# Patient Record
Sex: MALE | Race: WHITE | NOT HISPANIC OR LATINO | Employment: FULL TIME | URBAN - METROPOLITAN AREA
[De-identification: names, ages, dates, MRNs, and addresses within clinical notes are randomized per-mention and may not be internally consistent; named-entity substitution may affect disease eponyms.]

---

## 2024-05-23 ENCOUNTER — APPOINTMENT (OUTPATIENT)
Dept: RADIOLOGY | Facility: HOSPITAL | Age: 59
End: 2024-05-23
Payer: COMMERCIAL

## 2024-05-23 ENCOUNTER — APPOINTMENT (OUTPATIENT)
Dept: CARDIOLOGY | Facility: HOSPITAL | Age: 59
End: 2024-05-23
Payer: COMMERCIAL

## 2024-05-23 ENCOUNTER — HOSPITAL ENCOUNTER (EMERGENCY)
Facility: HOSPITAL | Age: 59
Discharge: HOME | End: 2024-05-23
Attending: STUDENT IN AN ORGANIZED HEALTH CARE EDUCATION/TRAINING PROGRAM
Payer: COMMERCIAL

## 2024-05-23 VITALS
OXYGEN SATURATION: 100 % | WEIGHT: 234 LBS | BODY MASS INDEX: 31.01 KG/M2 | DIASTOLIC BLOOD PRESSURE: 75 MMHG | RESPIRATION RATE: 18 BRPM | HEIGHT: 73 IN | TEMPERATURE: 97.7 F | SYSTOLIC BLOOD PRESSURE: 111 MMHG
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
## 2024-05-23 DIAGNOSIS — S60.222A CONTUSION OF LEFT HAND, INITIAL ENCOUNTER: ICD-10-CM

## 2024-05-23 DIAGNOSIS — M79.642 PAIN OF LEFT HAND: ICD-10-CM

## 2024-05-23 LAB
ALBUMIN SERPL BCP-MCNC: 4.2 G/DL (ref 3.4–5)
ALP SERPL-CCNC: 78 U/L (ref 33–136)
ALT SERPL W P-5'-P-CCNC: 20 U/L (ref 10–52)
ANION GAP SERPL CALC-SCNC: 11 MMOL/L (ref 10–20)
AST SERPL W P-5'-P-CCNC: 21 U/L (ref 9–39)
BASOPHILS # BLD AUTO: 0.04 X10*3/UL (ref 0–0.1)
BASOPHILS NFR BLD AUTO: 0.6 %
BILIRUB SERPL-MCNC: 1.5 MG/DL (ref 0–1.2)
BUN SERPL-MCNC: 30 MG/DL (ref 6–23)
CALCIUM SERPL-MCNC: 8.6 MG/DL (ref 8.6–10.3)
CARDIAC TROPONIN I PNL SERPL HS: 7 NG/L (ref 0–20)
CHLORIDE SERPL-SCNC: 111 MMOL/L (ref 98–107)
CO2 SERPL-SCNC: 21 MMOL/L (ref 21–32)
CREAT SERPL-MCNC: 1.19 MG/DL (ref 0.5–1.3)
EGFRCR SERPLBLD CKD-EPI 2021: 70 ML/MIN/1.73M*2
EOSINOPHIL # BLD AUTO: 0.12 X10*3/UL (ref 0–0.7)
EOSINOPHIL NFR BLD AUTO: 1.7 %
ERYTHROCYTE [DISTWIDTH] IN BLOOD BY AUTOMATED COUNT: 11.8 % (ref 11.5–14.5)
GLUCOSE SERPL-MCNC: 99 MG/DL (ref 74–99)
HCT VFR BLD AUTO: 43.3 % (ref 41–52)
HGB BLD-MCNC: 15 G/DL (ref 13.5–17.5)
IMM GRANULOCYTES # BLD AUTO: 0.03 X10*3/UL (ref 0–0.7)
IMM GRANULOCYTES NFR BLD AUTO: 0.4 % (ref 0–0.9)
LYMPHOCYTES # BLD AUTO: 1.76 X10*3/UL (ref 1.2–4.8)
LYMPHOCYTES NFR BLD AUTO: 24.7 %
MCH RBC QN AUTO: 31.3 PG (ref 26–34)
MCHC RBC AUTO-ENTMCNC: 34.6 G/DL (ref 32–36)
MCV RBC AUTO: 90 FL (ref 80–100)
MONOCYTES # BLD AUTO: 0.56 X10*3/UL (ref 0.1–1)
MONOCYTES NFR BLD AUTO: 7.9 %
NEUTROPHILS # BLD AUTO: 4.61 X10*3/UL (ref 1.2–7.7)
NEUTROPHILS NFR BLD AUTO: 64.7 %
NRBC BLD-RTO: 0 /100 WBCS (ref 0–0)
PLATELET # BLD AUTO: 179 X10*3/UL (ref 150–450)
POTASSIUM SERPL-SCNC: 3.9 MMOL/L (ref 3.5–5.3)
PROT SERPL-MCNC: 6.5 G/DL (ref 6.4–8.2)
RBC # BLD AUTO: 4.8 X10*6/UL (ref 4.5–5.9)
SODIUM SERPL-SCNC: 139 MMOL/L (ref 136–145)
WBC # BLD AUTO: 7.1 X10*3/UL (ref 4.4–11.3)

## 2024-05-23 PROCEDURE — 2550000001 HC RX 255 CONTRASTS: Performed by: STUDENT IN AN ORGANIZED HEALTH CARE EDUCATION/TRAINING PROGRAM

## 2024-05-23 PROCEDURE — 71045 X-RAY EXAM CHEST 1 VIEW: CPT | Performed by: RADIOLOGY

## 2024-05-23 PROCEDURE — 72125 CT NECK SPINE W/O DYE: CPT

## 2024-05-23 PROCEDURE — 85025 COMPLETE CBC W/AUTO DIFF WBC: CPT | Performed by: STUDENT IN AN ORGANIZED HEALTH CARE EDUCATION/TRAINING PROGRAM

## 2024-05-23 PROCEDURE — 36415 COLL VENOUS BLD VENIPUNCTURE: CPT | Performed by: STUDENT IN AN ORGANIZED HEALTH CARE EDUCATION/TRAINING PROGRAM

## 2024-05-23 PROCEDURE — 80053 COMPREHEN METABOLIC PANEL: CPT | Performed by: STUDENT IN AN ORGANIZED HEALTH CARE EDUCATION/TRAINING PROGRAM

## 2024-05-23 PROCEDURE — 70450 CT HEAD/BRAIN W/O DYE: CPT | Performed by: RADIOLOGY

## 2024-05-23 PROCEDURE — 73110 X-RAY EXAM OF WRIST: CPT | Mod: LEFT SIDE | Performed by: RADIOLOGY

## 2024-05-23 PROCEDURE — 73130 X-RAY EXAM OF HAND: CPT | Mod: LT

## 2024-05-23 PROCEDURE — 72128 CT CHEST SPINE W/O DYE: CPT | Performed by: RADIOLOGY

## 2024-05-23 PROCEDURE — 70450 CT HEAD/BRAIN W/O DYE: CPT

## 2024-05-23 PROCEDURE — 84484 ASSAY OF TROPONIN QUANT: CPT | Performed by: STUDENT IN AN ORGANIZED HEALTH CARE EDUCATION/TRAINING PROGRAM

## 2024-05-23 PROCEDURE — 72131 CT LUMBAR SPINE W/O DYE: CPT

## 2024-05-23 PROCEDURE — 73110 X-RAY EXAM OF WRIST: CPT | Mod: LT

## 2024-05-23 PROCEDURE — 71260 CT THORAX DX C+: CPT | Performed by: RADIOLOGY

## 2024-05-23 PROCEDURE — 71045 X-RAY EXAM CHEST 1 VIEW: CPT

## 2024-05-23 PROCEDURE — 72131 CT LUMBAR SPINE W/O DYE: CPT | Performed by: RADIOLOGY

## 2024-05-23 PROCEDURE — 72125 CT NECK SPINE W/O DYE: CPT | Performed by: RADIOLOGY

## 2024-05-23 PROCEDURE — 99285 EMERGENCY DEPT VISIT HI MDM: CPT | Mod: 25

## 2024-05-23 PROCEDURE — 74177 CT ABD & PELVIS W/CONTRAST: CPT | Performed by: RADIOLOGY

## 2024-05-23 PROCEDURE — 74177 CT ABD & PELVIS W/CONTRAST: CPT

## 2024-05-23 PROCEDURE — 73130 X-RAY EXAM OF HAND: CPT | Mod: LEFT SIDE | Performed by: RADIOLOGY

## 2024-05-23 PROCEDURE — 72128 CT CHEST SPINE W/O DYE: CPT

## 2024-05-23 PROCEDURE — 93005 ELECTROCARDIOGRAM TRACING: CPT

## 2024-05-23 RX ORDER — NAPROXEN 500 MG/1
500 TABLET ORAL
Qty: 30 TABLET | Refills: 0 | Status: SHIPPED | OUTPATIENT
Start: 2024-05-23 | End: 2024-06-07

## 2024-05-23 RX ORDER — ACETAMINOPHEN 325 MG/1
650 TABLET ORAL EVERY 6 HOURS PRN
Qty: 30 TABLET | Refills: 0 | Status: SHIPPED | OUTPATIENT
Start: 2024-05-23

## 2024-05-23 RX ORDER — ACETAMINOPHEN 325 MG/1
975 TABLET ORAL ONCE
Status: COMPLETED | OUTPATIENT
Start: 2024-05-23 | End: 2024-05-23

## 2024-05-23 RX ADMIN — ACETAMINOPHEN 975 MG: 325 TABLET ORAL at 16:38

## 2024-05-23 RX ADMIN — IOHEXOL 90 ML: 350 INJECTION, SOLUTION INTRAVENOUS at 19:06

## 2024-05-23 ASSESSMENT — PAIN - FUNCTIONAL ASSESSMENT: PAIN_FUNCTIONAL_ASSESSMENT: 0-10

## 2024-05-23 ASSESSMENT — LIFESTYLE VARIABLES
HAVE YOU EVER FELT YOU SHOULD CUT DOWN ON YOUR DRINKING: NO
EVER FELT BAD OR GUILTY ABOUT YOUR DRINKING: NO
HAVE PEOPLE ANNOYED YOU BY CRITICIZING YOUR DRINKING: NO
EVER HAD A DRINK FIRST THING IN THE MORNING TO STEADY YOUR NERVES TO GET RID OF A HANGOVER: NO
TOTAL SCORE: 0

## 2024-05-23 ASSESSMENT — PAIN DESCRIPTION - LOCATION: LOCATION: HAND

## 2024-05-23 ASSESSMENT — COLUMBIA-SUICIDE SEVERITY RATING SCALE - C-SSRS
2. HAVE YOU ACTUALLY HAD ANY THOUGHTS OF KILLING YOURSELF?: NO
1. IN THE PAST MONTH, HAVE YOU WISHED YOU WERE DEAD OR WISHED YOU COULD GO TO SLEEP AND NOT WAKE UP?: NO
6. HAVE YOU EVER DONE ANYTHING, STARTED TO DO ANYTHING, OR PREPARED TO DO ANYTHING TO END YOUR LIFE?: NO

## 2024-05-23 ASSESSMENT — PAIN DESCRIPTION - ORIENTATION: ORIENTATION: LEFT

## 2024-05-23 ASSESSMENT — PAIN SCALES - GENERAL
PAINLEVEL_OUTOF10: 7
PAINLEVEL_OUTOF10: 3

## 2024-05-23 NOTE — ED PROVIDER NOTES
HPI   Chief Complaint   Patient presents with   • Hand Injury     Per pt, was working on marble sink when it tipped over and fell on left hand around 1500.  Some swelling noted to hand and small lac to palm. Tetanus up to date.        This is a 59-year-old male presented to the emergency department for a hand injury.  Patient was at work when a marble sink tipped over and landed on his left second through fifth digits.  States he is still able to move the hand though has had swelling and pain.  Denies any pain at the thumb or the wrist.  He denies additional injuries.      History provided by:  Patient   used: No                        [place] Coma Scale Score: 15                     Patient History   No past medical history on file.  No past surgical history on file.  No family history on file.  Social History     Tobacco Use   • Smoking status: Not on file   • Smokeless tobacco: Not on file   Substance Use Topics   • Alcohol use: Not on file   • Drug use: Not on file       Physical Exam   ED Triage Vitals [05/23/24 1541]   Temperature Pulse Respirations BP   36.5 °C (97.7 °F) -- 18 111/75      Pulse Ox Temp src Heart Rate Source Patient Position   100 % -- -- --      BP Location FiO2 (%)     -- --       Physical Exam  GEN: well appearing, no acute distress  PULM: CTAB b/l no wheezes, crackles, or rhonchi   GI: NT/ND, no masses or organomegaly, soft, no guarding  EXT: 2+ periph pulses in bilat radial, left upper extremity with swelling and ecchymosis along the MCP and PIP second through fourth digits.  Skin tear overlying PIP fifth digit and abrasion/superficial laceration less than 0.5 cm proximal to fifth digit palmar hand.  No injury or tenderness to the first digit, no snuffbox tenderness, no obvious deformity outside of the swelling.  No tenderness palpation at the wrist/forearm/elbow.  Range of motion at the digits limited by swelling and pain  NEURO:  no focal deficits, no facial  "asymmetry, moving all extremities  PSYCH: AAOx3 answers questions appropriately  ED Course & MDM   ED Course as of 05/27/24 1344   Thu May 23, 2024   1712 X-ray patient hand showed no acute fractures.  When going to reevaluate the patient he states he is now having chest pain.  Upon further discussion patient states when his hand got pinched he actually was on top of a dumpster and fell off backwards.  States he fell onto his butt/back.  He thinks this was approximately 7 to 8 feet.  He is not having any pain to the back or chest prior to being in the emergency department.  He does have some reproducible chest wall tenderness to palpation.  He has some scattered old bruises to his abdomen and states this is from carrying stone at work.  With this new information CT imaging to be performed as well as cardiac workup. [DE]   2000 Patient's lab work and imaging are all unremarkable without acute traumatic findings.  Results were discussed with patient and his symptoms have improved in the emergency department.  He feels comfortable going home I do feel he is safe for discharge.  Return precautions discussed and patient discharged in stable condition. [DE]      ED Course User Index  [DE] Zaid Carr MD         Diagnoses as of 05/27/24 1344   Fall, initial encounter   Pain of left hand   Contusion of left hand, initial encounter       Medical Decision Making  This is a 59-year-old male presented to the emergency department for a hand injury.  Patient stable upon presentation to the emergency department, no acute distress and vitals are unremarkable.  On exam patient has ecchymosis and swelling to the left second through fifth digits at the MCP/PIP.  His small abrasion/skin tear that will not require closure.  Tetanus is up-to-date.  X-rays to be performed for potential traumatic injury.  He has no snuffbox tenderness.  Patient to be given pain meds and wife is requesting to start with Tylenol as patient gets \"loopy\" with " stronger medications.    Procedure  Procedures     Zaid Carr MD  05/27/24 9630

## 2024-05-29 LAB
ATRIAL RATE: 56 BPM
P AXIS: 48 DEGREES
PR INTERVAL: 182 MS
Q ONSET: 254 MS
QRS COUNT: 9 BEATS
QRS DURATION: 136 MS
QT INTERVAL: 456 MS
QTC CALCULATION(BAZETT): 441 MS
QTC FREDERICIA: 446 MS
R AXIS: 1 DEGREES
T AXIS: 11 DEGREES
T OFFSET: 482 MS
VENTRICULAR RATE: 56 BPM

## 2024-06-21 ENCOUNTER — HOSPITAL ENCOUNTER (OUTPATIENT)
Dept: RADIOLOGY | Facility: HOSPITAL | Age: 59
Discharge: HOME | End: 2024-06-21
Payer: COMMERCIAL

## 2024-06-21 DIAGNOSIS — S63.92XA SPRAIN OF UNSPECIFIED PART OF LEFT WRIST AND HAND, INITIAL ENCOUNTER: ICD-10-CM

## 2024-06-21 PROCEDURE — 73110 X-RAY EXAM OF WRIST: CPT | Mod: LT

## 2024-07-01 NOTE — PROGRESS NOTES
Occupational Therapy Evaluation    Patient Name:  Rambo Gerber   Patient MRN: [id number]  Date: 7/2/2024  Time Calculation  Start Time: 1715  Stop Time: 1800  Time Calculation (min): 45 min    OT Evaluation Time Entry  OT Evaluation (Low) Time Entry: 20  OT Therapeutic Procedures Time Entry  Therapeutic Exercise Time Entry: 25                   ASSESSMENT:  Patient was referred to occupational therapy for an evaluation and treatment s/p a work-related accident resulting in L hand pain. OT evaluation completed this date.  Patient's main functional deficits include lifting, grasping, opening jars and tying his shoes.  Patient would benefit from skilled OT in order to decrease digit pain and swelling and to increase digit AROM, /pinch strength, and fine motor coordination to improve participation in ADLs, IADLs, and work tasks.   Patient was issued written and illustrated handouts for tendon glides, pink foam squeezes, and digit coordination exercises for HEP to address these deficits. Patient verbalizes good understanding of HEP.    PLAN:   OT intervention plan includes: education/instruction, home program, manual therapy, therapeutic activities, therapeutic exercises, and modalities.   Frequency and duration:2 time(s) a week, for 6 weeks .   Potential to achieve rehab goals is good.     Plan of care was developed with input and agreement by the patient.     Insurance:  Visit number: 1 of 12  Insurance Type: Payor: [employer] / Plan: [employer] / Product Type: *No Product type* /   Authorization or Plan of Care date Range: 06/21-08/02/24  Copay: n/a  Referred by: Sumeet Turk APRN-*   Approved diagnosis; S60.222A    SUBJECTIVE:  Patient is a 59 old who attends OT evaluation today accompanied by his wife, Hanh.  he  reports on 05/23/24 he was on the side of a dumpster. He tried to push a sink into the dumpster, however the sink rocked back and hit his hand. He then fell on his  back.  Patient presented to [facility] ER where x-rays indicated no acute fractures to L hand. X-rays on L wrist taken on 06/21/24, also indicated no fxs. Patient states current resting pain is a 4/10, however it varies for each digit. Pain increases to a 8/10 with repetitive movements and work tasks. Patient experiences constant n/t over L 3rd and 4th proximal phalanx. Notes n/t an radiate to entire hand and proximally up forearm with increased use of LUE. Patient take Tylenol and Advil for the pain and ices during his lunch break and at night. Patient has difficulties with gripping, lifting, and tying his shoes but is independent in all ADLs, IADLs, and work tasks. He is relying on RUE for task completion, leading to muscle fatigue on the R side.     he is RHD   his chief complaint is swelling and pain in L hand.  his goal for Occupational Therapy is to improve functional use of L hand         he lives with his wife in a house. he works full time as a .    General:  Reason for visit: L hand pain   Referred by: Sumeet Turk APRN-*      PMH includes: none    Medical Screening:   Reviewed medical history form with patient and medical screening assessed.     Precautions: none   Fall Risk: None          Pain Assessment:      Pain Assessment: 0-10      Pain (0-10): 4; 8 with activity      Pain Location L hand    OBJECTIVE:  Active range of motion:  L Wrist:  - Flexion: 45  - Extension: 55  - Rd dev: 20  - Ul dev: 25    L 3rd Digit:  - MCP ext: -13  - MCP flex: 76  - PIP ext: -16  - PIP flex: 90  - DIP ext: +9  - DIP flex: 56    L 4th Digit:  - MCP ext: -10  - MCP flex: 80  - PIP ext: -10  - PIP flex: 100  - DIP ext: 0  - DIP flex: 54     strength:  - RUE : 80#  - LUE : 14#    Pinch Strength:  - Lateral:       - RUE: 14#       - LUE: 4#  - 3 Jaw:       - RUE: 17#       - LUE: 7#    Outcome Measures:  OT Adult Other Outcome Measures:   9 Hole Peg Test: R: 24.9 secs      L: 19.1 secs  OT Adult  "Other Outcome Measures  Other Outcome Measures: 33 (QuickDASH)  (50%)      Goals:  Patient will present with a pain score of 2/10 in L hand.    Patient to increase L 3rd digit PIPJ extension AROM by 5 degrees in order to improve independent performance in daily activities.     Patient to increase L 4th digit DIPJ flexion AROM by 5 degrees in order to improve independent performance in daily activities.     Patient will improve L  strength by 15lb to improve performance in lifting and grasping tasks.     Patient to improve QuickDASH score to at or below 20% to increase independency in ADLs and IADLs.      Patient will report understanding of home program, demonstrate independence and verbalize precautions.    Treatment Performed: (\"NP\" = Not Performed)     Treatment:  Low Complex OT Eval: 20 mins    Therapeutic Exercise: 25 mins  - Demonstrated tendon glides, pink foam squeezes, and digit coordination exercises; provided HEP handout and pink foam   - educated patient on heat v ice  - Provided patient with a compression glove. Educated patient on purpose, donning/doffing, wearing schedule (nightly), and proper care of glove.   - Patient demonstrated proper re-call by donning/doffing glove independently. Verbalized good understanding of glove.     Therapeutic Activities: NP  -   Manual Therapy: NP  -   Neuromuscular Re-Education: NP  -   Modalities: NP  -     Education: Reviewed home exercise program.  "

## 2024-07-02 ENCOUNTER — EVALUATION (OUTPATIENT)
Dept: OCCUPATIONAL THERAPY | Facility: CLINIC | Age: 59
End: 2024-07-02
Payer: COMMERCIAL

## 2024-07-02 DIAGNOSIS — S60.222A CONTUSION OF LEFT HAND, INITIAL ENCOUNTER: ICD-10-CM

## 2024-07-02 PROCEDURE — 97165 OT EVAL LOW COMPLEX 30 MIN: CPT | Mod: GO

## 2024-07-02 PROCEDURE — 97110 THERAPEUTIC EXERCISES: CPT | Mod: GO

## 2024-07-02 ASSESSMENT — ENCOUNTER SYMPTOMS
DEPRESSION: 0
LOSS OF SENSATION IN FEET: 0
OCCASIONAL FEELINGS OF UNSTEADINESS: 0

## 2024-07-02 ASSESSMENT — PAIN - FUNCTIONAL ASSESSMENT: PAIN_FUNCTIONAL_ASSESSMENT: 0-10

## 2024-07-02 ASSESSMENT — PATIENT HEALTH QUESTIONNAIRE - PHQ9
1. LITTLE INTEREST OR PLEASURE IN DOING THINGS: NOT AT ALL
SUM OF ALL RESPONSES TO PHQ9 QUESTIONS 1 AND 2: 0
2. FEELING DOWN, DEPRESSED OR HOPELESS: NOT AT ALL

## 2024-07-02 ASSESSMENT — PAIN SCALES - GENERAL: PAINLEVEL_OUTOF10: 4

## 2024-07-15 ENCOUNTER — APPOINTMENT (OUTPATIENT)
Dept: OCCUPATIONAL THERAPY | Facility: CLINIC | Age: 59
End: 2024-07-15
Payer: COMMERCIAL

## 2024-07-17 NOTE — PROGRESS NOTES
OCCUPATIONAL THERAPY TREATMENT NOTE    Patient Name:  Rambo Gerber   Patient MRN: [id number]  Date: 7/18/2024  Time Calculation  Start Time: 1715  Stop Time: 1800  Time Calculation (min): 45 min    Insurance:  Visit number: 2 of 12  Insurance Type: Payor: [employer] / Plan: [employer] / Product Type: *No Product type* /   Authorization or Plan of Care date Range: 06/21-08/02/24   Copay: n/a  Referred by: Sumeet Turk APRN-*   Approved diagnosis: S60.222A        OT Therapeutic Procedures Time Entry  Manual Therapy Time Entry: 15  Therapeutic Activity Time Entry: 15  OT Modalities Time Entry  Whirlpool Time Entry: 15                  General:  Reason for visit: L hand pain   Referred by: Sumeet Turk APRN-*      Assessment:  Progress towards functional goals:  HEP compliance, Improved mobility in L 3rd and 4th digit, Improved performance in daily activities , and Reduce pain level  Response to interventions:  Increased AROM in L 3rd and 4th digit after fluidotherapy and passive stretches. Patient notes increase scar tissue build up in proximal and middle phalanx of L 3rd and 4th digits. Graston utilized to decrease scar tissue build up to improve digit mobility. Patient notes relief after STM. Facilitated BTE program to begin lightly strengthening L hand and to elicit a composite  around tool. Patient required intermittent rest breaks after each activity to prevent severe soreness or fatigue. Mild soreness experienced in L 3rd and 4th digit. Will progress as tolerated.    and Patient tolerated therapeutic exercises well and without any adverse events.  Justification for continued skilled care: To address remaining functional, objective and subjective deficits to allow them to return to full independence with ADLs. Modify and progress home exercise program. Skilled intervention required to improve ROM which will improve  "function. Reduce pain to improve function.    Plan:  Progress exercises as tolerated to improve overall UE strength and performance in daily activities , Therapeutic exercises to strengthen L hand for functional use in daily activities. , Exercises to gradually increase range of motion., Manual therapy to  muscle tightness and improve joint mobility. , and Modalities as needed to address symptoms.    Therapy diagnoses:   1. Contusion of left hand, initial encounter  Follow Up In Occupational Therapy           Subjective:  Rambo reports he feels like his condition is improving.  Progress towards functional goal:   Patient reports he has less pain and improved mobility in L 3rd and 4th digits. , Improved mobility in L digits, and Reduce pain level  Pain Location L 3rd and 4th digit  Pain (0-10): 4   HEP adherence / understanding: compliance with the instructed home exercises.    Precautions:  Fall Risk: None    Precautions listed: none    Objective: NP    Treatment Performed: (\"NP\" = Not Performed)     Therapeutic Exercise: NP  -   Therapeutic Activities: 15 mins  -  4.3T; 120 secs   -  2.3T; 2 x 60 secs   -   trials 7.8T, 9.8T, 11.8T; 90 secs each     Manual Therapy: 15 mins  - STM, scar tissue massage, and graston to L 3rd and 4th digit  - Mobilization and stretches to all joints of L 3rd and 4th digit    Neuromuscular Re-Education: NP  -   Modalities: 15 mins  - Fluidotherapy with wrist and digit AROM     Education: Reviewed home exercise program.  "

## 2024-07-18 ENCOUNTER — TREATMENT (OUTPATIENT)
Dept: OCCUPATIONAL THERAPY | Facility: CLINIC | Age: 59
End: 2024-07-18
Payer: COMMERCIAL

## 2024-07-18 DIAGNOSIS — S60.222A CONTUSION OF LEFT HAND, INITIAL ENCOUNTER: ICD-10-CM

## 2024-07-18 PROCEDURE — 97140 MANUAL THERAPY 1/> REGIONS: CPT | Mod: GO

## 2024-07-18 PROCEDURE — 97530 THERAPEUTIC ACTIVITIES: CPT | Mod: GO

## 2024-07-18 PROCEDURE — 97022 WHIRLPOOL THERAPY: CPT | Mod: GO

## 2024-07-18 ASSESSMENT — PAIN SCALES - GENERAL: PAINLEVEL_OUTOF10: 4

## 2024-07-18 ASSESSMENT — PAIN - FUNCTIONAL ASSESSMENT: PAIN_FUNCTIONAL_ASSESSMENT: 0-10

## 2024-07-22 NOTE — PROGRESS NOTES
OCCUPATIONAL THERAPY TREATMENT NOTE    Patient Name:  Rambo Gerber   Patient MRN: [id number]  Date: 7/23/2024  Time Calculation  Start Time: 1700  Stop Time: 1745  Time Calculation (min): 45 min    Insurance:  Visit number: 3 of 12  Insurance Type: Payor: [employer] / Plan: [employer] / Product Type: *No Product type* /   Authorization or Plan of Care date Range: 06/21-08/02/24   Copay: n/a  Referred by: Sumeet Turk APRN-*   Approved diagnosis: S60.222A        OT Therapeutic Procedures Time Entry  Manual Therapy Time Entry: 15  Therapeutic Activity Time Entry: 15  OT Modalities Time Entry  Whirlpool Time Entry: 15                  General:  Reason for visit: L hand pain   Referred by: Sumeet Turk APRN-*      Assessment:  Progress towards functional goals:  HEP compliance, Improved mobility in L 3rd and 4th digit, Improved performance in daily activities , and Reduce pain level  Response to interventions:  Achieved normal AROM in L III and IV digit during passive stretching. Decreased scar tissue build up noted after IASTM. Patient notes mild tenderness to proximal and middle phalanx of digit III and IV. Increases scar tissue build up noted most in proximal phalanx of digit IV. Increased activity tolerance noted as patient performed  and 162 with increased resistance. Performed  trials with no adverse reaction. Will progress as tolerated.  , Patient tolerated therapeutic exercises well and without any adverse events., and Tolerated increased resistance during  & 162.  Justification for continued skilled care: To address remaining functional, objective and subjective deficits to allow them to return to full independence with ADLs. Modify and progress home exercise program. Skilled intervention required to improve ROM which will improve function. Reduce pain to improve function.    Plan:  Progress  "exercises as tolerated to improve overall UE strength and performance in daily activities , Therapeutic exercises to strengthen L hand for functional use in daily activities. , Exercises to gradually increase range of motion., Manual therapy to  muscle tightness and improve joint mobility. , and Modalities as needed to address symptoms.    Therapy diagnoses:   1. Contusion of left hand, initial encounter  Follow Up In Occupational Therapy             Subjective:  Rambo reports he feels like his condition is improving.  Progress towards functional goal:   Patient reports he has noticed decreased scar tissue build up on the L III digit and L IV digit. His gripping and lifting skills have improved. Patient is taking less pain medication., Improved mobility in L digits, Improved performance in daily activities , and Reduce pain level  Pain Location L 3rd and 4th digit  Pain (0-10): 3   HEP adherence / understanding: compliance with the instructed home exercises.    Precautions:  Fall Risk: None    Precautions listed: none    Objective: NP    Treatment Performed: (\"NP\" = Not Performed)     Therapeutic Exercise: NP  -   Therapeutic Activities: 15 mins  -  4.6T; 120 secs   -  3.0T; 2 x 60 secs   -   trials 12.4T, 14.4T, 16.4T; 90 secs each     Manual Therapy: 15 mins  - STM, scar tissue massage, and graston to L 3rd and 4th digit  - Mobilization and stretches to all joints of L 3rd and 4th digit    Neuromuscular Re-Education: NP  -   Modalities: 15 mins  - Fluidotherapy with wrist and digit AROM     Education: Reviewed home exercise program.  "

## 2024-07-23 ENCOUNTER — TREATMENT (OUTPATIENT)
Dept: OCCUPATIONAL THERAPY | Facility: CLINIC | Age: 59
End: 2024-07-23
Payer: COMMERCIAL

## 2024-07-23 DIAGNOSIS — S60.222A CONTUSION OF LEFT HAND, INITIAL ENCOUNTER: ICD-10-CM

## 2024-07-23 PROCEDURE — 97140 MANUAL THERAPY 1/> REGIONS: CPT | Mod: GO

## 2024-07-23 PROCEDURE — 97022 WHIRLPOOL THERAPY: CPT | Mod: GO

## 2024-07-23 PROCEDURE — 97530 THERAPEUTIC ACTIVITIES: CPT | Mod: GO

## 2024-07-23 ASSESSMENT — PAIN SCALES - GENERAL: PAINLEVEL_OUTOF10: 3

## 2024-07-23 ASSESSMENT — PAIN - FUNCTIONAL ASSESSMENT: PAIN_FUNCTIONAL_ASSESSMENT: 0-10

## 2024-07-25 ENCOUNTER — TREATMENT (OUTPATIENT)
Dept: OCCUPATIONAL THERAPY | Facility: CLINIC | Age: 59
End: 2024-07-25
Payer: COMMERCIAL

## 2024-07-25 DIAGNOSIS — S60.222A CONTUSION OF LEFT HAND, INITIAL ENCOUNTER: ICD-10-CM

## 2024-07-25 PROCEDURE — 97530 THERAPEUTIC ACTIVITIES: CPT | Mod: GO

## 2024-07-25 PROCEDURE — 97022 WHIRLPOOL THERAPY: CPT | Mod: GO

## 2024-07-25 PROCEDURE — 97140 MANUAL THERAPY 1/> REGIONS: CPT | Mod: GO

## 2024-07-25 NOTE — PROGRESS NOTES
OCCUPATIONAL THERAPY TREATMENT NOTE    Patient Name:  Rambo Gerber   Patient MRN: [id number]  Date: 7/25/2024  Time Calculation  Start Time: 1635  Stop Time: 1720  Time Calculation (min): 45 min    Insurance:  Visit number: 4 of 12  Insurance Type: Payor: [employer] / Plan: [employer] / Product Type: *No Product type* /   Authorization or Plan of Care date Range: 06/21-08/02/24   Copay: n/a  Referred by: Sumeet Turk APRN-*   Approved diagnosis: S60.222A        OT Therapeutic Procedures Time Entry  Manual Therapy Time Entry: 20  Therapeutic Activity Time Entry: 10  OT Modalities Time Entry  Whirlpool Time Entry: 15                  General:  Reason for visit: L hand pain   Referred by: Sumeet Turk APRN-*      Assessment:  Progress towards functional goals:  HEP compliance, Improved mobility in L 3rd and 4th digit, Improved performance in daily activities , and Reduce pain level  Response to interventions:  Scar tissue build up still present in L III and IV digit. Mild tenderness with IASTM to scar on proximal phalanx of L IV digit. Achieved normal AROM in digits during passive stretches. BTE not performed d/t increased soreness experienced. Performed fine motor activities to improve digit coordination and sustained pinch skills with digits III and IV. Patient demonstrated mild difficulties with opposition and pinch with thumb and IV digit. Will progress as tolerated.   and Patient tolerated therapeutic exercises well and without any adverse events.  Justification for continued skilled care: To address remaining functional, objective and subjective deficits to allow them to return to full independence with ADLs. Modify and progress home exercise program. Skilled intervention required to improve ROM which will improve function. Reduce pain to improve function.    Plan:  Progress exercises as tolerated to improve overall UE  "strength and performance in daily activities , Therapeutic exercises to strengthen L hand for functional use in daily activities. , Exercises to gradually increase range of motion., Manual therapy to  muscle tightness and improve joint mobility. , and Modalities as needed to address symptoms.    Therapy diagnoses:   1. Contusion of left hand, initial encounter  Follow Up In Occupational Therapy           Subjective:  Rambo reports he feels like his condition is improving.  Progress towards functional goal:   Patient reports he had a follow up with Dr. Turk who stated his digits are healing well. He extended patient's C9. Patient reports he has increased soreness in his digits from the previous session and work.  , Improved mobility in L digits, Improved performance in daily activities , and Reduce pain level  Pain Location L 3rd and 4th digit  Pain (0-10): 4   HEP adherence / understanding: compliance with the instructed home exercises.    Precautions:  Fall Risk: None    Precautions listed: none    Objective: 24    Treatment Performed: (\"NP\" = Not Performed)     Therapeutic Exercise: NP  -   Therapeutic Activities: 10 mins  - Peg melissa with alternating digits   - Pinch place and remove pegs with tweezers in a square formation during [place] [employer] Pegboard    Manual Therapy: 20 mins  - STM, scar tissue massage, and graston to L 3rd and 4th digit  - Mobilization and stretches to all joints of L 3rd and 4th digit    Neuromuscular Re-Education: NP  -   Modalities: 15 mins  - Fluidotherapy with wrist and digit AROM     Education: Reviewed home exercise program.  " Simple: Patient demonstrates quick and easy understanding/Verbalized Understanding

## 2024-07-26 NOTE — PROGRESS NOTES
OCCUPATIONAL THERAPY TREATMENT NOTE    Patient Name:  Rambo Gerber   Patient MRN: [id number]  Date: 7/30/2024  Time Calculation  Start Time: 1715  Stop Time: 1800  Time Calculation (min): 45 min    Insurance:  Visit number: 5 of 12  Insurance Type: Payor: [employer] / Plan: [employer] / Product Type: *No Product type* /   Authorization or Plan of Care date Range: 06/21-08/02/24   Copay: n/a  Referred by: Sumeet Turk APRN-*   Approved diagnosis: S60.222A        OT Therapeutic Procedures Time Entry  Manual Therapy Time Entry: 15  Therapeutic Activity Time Entry: 15  OT Modalities Time Entry  Whirlpool Time Entry: 15                  General:  Reason for visit: L hand pain   Referred by: Sumeet Turk APRN-*      Assessment:  Progress towards functional goals:  HEP compliance, Improved mobility in L 3rd and 4th digit, Improved performance in daily activities , and Reduce pain level  Response to interventions:  Decreased scar tissue build up noted after IASTM in both affected digits. Introduced theraputty activities to promote digit mobility and strengthen intrinsic muscles. Patient tolerated new activities well. Provided HEP handout and theraputty. Patient verbalized full understanding of new activities. and Patient tolerated therapeutic exercises well and without any adverse events.  Justification for continued skilled care: To address remaining functional, objective and subjective deficits to allow them to return to full independence with ADLs. Modify and progress home exercise program. Skilled intervention required to improve ROM which will improve function. Reduce pain to improve function.    Plan:  Progress exercises as tolerated to improve overall UE strength and performance in daily activities , Therapeutic exercises to strengthen L hand for functional use in daily activities. , Exercises to gradually increase range of  "motion., Manual therapy to  muscle tightness and improve joint mobility. , and Modalities as needed to address symptoms.    Therapy diagnoses:   1. Contusion of left hand, initial encounter  Follow Up In Occupational Therapy          Subjective:  Rambo reports he feels like his condition is improving.  Progress towards functional goal:   Patient reports he has experienced increased pain and soreness in L III and IV digits. He thinks he could have over worked his hand at work. Notes he is down with this job and has a week off. Still palpates scar tissue over proximal phalanx of both digits.  and Improved performance in daily activities   Pain Location L 3rd and 4th digit  Pain (0-10): 4   HEP adherence / understanding: compliance with the instructed home exercises.    Precautions:  Fall Risk: None    Precautions listed: none    Objective: 24    Treatment Performed: (\"NP\" = Not Performed)     Therapeutic Exercise: NP  -   Therapeutic Activities: 15 mins  - Theraputty activities; Provided HEP handout and blue putty        - Mass grasp        - Alternating digit pinch       - Alternating digit pinch and pull        - Intrinsic plus      Manual Therapy: 15 mins  - STM, scar tissue massage, and graston to L 3rd and 4th digit  - Mobilization and stretches to all joints of L 3rd and 4th digit    Neuromuscular Re-Education: NP  -   Modalities: 15 mins  - Fluidotherapy with wrist and digit AROM     Education: Added theraputty exercise(s) to HEP program  "

## 2024-07-30 ENCOUNTER — TREATMENT (OUTPATIENT)
Dept: OCCUPATIONAL THERAPY | Facility: CLINIC | Age: 59
End: 2024-07-30
Payer: COMMERCIAL

## 2024-07-30 DIAGNOSIS — S60.222A CONTUSION OF LEFT HAND, INITIAL ENCOUNTER: ICD-10-CM

## 2024-07-30 PROCEDURE — 97022 WHIRLPOOL THERAPY: CPT | Mod: GO

## 2024-07-30 PROCEDURE — 97140 MANUAL THERAPY 1/> REGIONS: CPT | Mod: GO

## 2024-07-30 PROCEDURE — 97530 THERAPEUTIC ACTIVITIES: CPT | Mod: GO

## 2024-07-30 ASSESSMENT — PAIN SCALES - GENERAL: PAINLEVEL_OUTOF10: 4

## 2024-07-30 ASSESSMENT — PAIN - FUNCTIONAL ASSESSMENT: PAIN_FUNCTIONAL_ASSESSMENT: 0-10

## 2024-07-31 NOTE — PROGRESS NOTES
OCCUPATIONAL THERAPY TREATMENT NOTE    Patient Name:  Rambo Gerber   Patient MRN: [id number]  Date: 8/1/2024  Time Calculation  Start Time: 1630  Stop Time: 1715  Time Calculation (min): 45 min    Insurance:  Visit number: 6 of 12  Insurance Type: Payor: [employer] / Plan: [employer] / Product Type: *No Product type* /   Authorization or Plan of Care date Range: 06/21-08/02/24 extended to 09/13/24  Copay: n/a  Referred by: Sumeet Turk APRN-*   Approved diagnosis: S60.222A        OT Therapeutic Procedures Time Entry  Manual Therapy Time Entry: 15  Therapeutic Exercise Time Entry: 20  OT Modalities Time Entry  Whirlpool Time Entry: 10                  General:  Reason for visit: L hand pain   Referred by: Sumeet Turk APRN-*      Assessment:  Progress towards functional goals:  HEP compliance, Improved mobility in L 3rd and 4th digit, Improved performance in daily activities , and Reduce pain level  Response to interventions:  Objective measurements were re-assessed indicating increased digit AROM and /pinch strength. Patient has achieved 5/6 goals. Patient's QuickDASH score has mildly decline, contradicting patient's improved objective measurements and pain score. Patient states this is because when he first completed the QuickDASH at his eval, he was not using his LUE often. He explains now that he is using his L hand in daily activities his participation has varied.  C9 extension was granted to continue addressing patient's L hand concerns. IASTM performed today to decrease scar tissue build up in proximal phalanx of L 3rd and 4th digit to improve AROM and participation in daily activities. Patient notes pain relief after IASTM.  and Patient tolerated therapeutic exercises well and without any adverse events.  Justification for continued skilled care: To address remaining functional, objective and subjective deficits  to allow them to return to full independence with ADLs. Modify and progress home exercise program. Skilled intervention required to improve ROM which will improve function. Reduce pain to improve function.    Plan:  Progress exercises as tolerated to improve overall UE strength and performance in daily activities , Therapeutic exercises to strengthen L hand for functional use in daily activities. , Exercises to gradually increase range of motion., Manual therapy to  muscle tightness and improve joint mobility. , and Modalities as needed to address symptoms.    Therapy diagnoses:   1. Contusion of left hand, initial encounter  Follow Up In Occupational Therapy          Subjective:  Rambo reports he feels like his condition is improving.  Progress towards functional goal:   Patient reports he has been off of work for 2-3 days and has been relaxing his L hand. Notices decreased pain since off of work. 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  and Improved performance in daily activities   Pain Location L 3rd and 4th digit  Pain (0-10): 2   HEP adherence / understanding: compliance with the instructed home exercises.    Precautions:  Fall Risk: None    Precautions listed: none    Objective:   Active range of motion:  L Wrist:  - Flexion: 65 (+20)  - Extension: 50 (-5)  - Rd dev: 15 (-5)  - Ul dev: 35 (+10)     L 3rd Digit:  - MCP ext: -10 (+3)  - MCP flex: 85 (+9)  - PIP ext: -5 (+11)  - PIP flex: 102 (+12)  - DIP ext: +9 (no change)  - DIP flex: 61 (+5)     L 4th Digit:  - MCP ext: 0 (+10)  - MCP flex: 82 (+2)  - PIP ext: -6 (+4)  - PIP flex: 110 (+10)  - DIP ext: 0 (no change)  - DIP flex: 62 (+8)      strength:  - RUE : 75# (-5#)  - LUE : 44# (+30#)     Pinch Strength:  - Lateral:       - RUE: 14# (no change)       - LUE: 13# (+9)  - 3 Jaw:       - RUE: 19# (+2#)       - LUE: 13# (+6#)    "  Outcome Measures:  OT Adult Other Outcome Measures  Other Outcome Measures: 35 (QuickDASH) (2 point decline)   (54.55%)     Goals:  Patient will present with a pain score of 2/10 in L hand. Met     Patient to increase L 3rd digit PIPJ extension AROM by 5 degrees in order to improve independent performance in daily activities. Met     Patient to increase L 4th digit DIPJ flexion AROM by 5 degrees in order to improve independent performance in daily activities. Met     Patient will improve L  strength by 15lb to improve performance in lifting and grasping tasks. Met     Patient to improve QuickDASH score to at or below 20% to increase independency in ADLs and IADLs. Progressing      Patient will report understanding of home program, demonstrate independence and verbalize precautions. Met    New Goals as of 08/01/24:  Patient to increase L 3rd digit MCPJ extension AROM by 5 degrees in order to improve independent performance in daily activities.     Patient will improve L 3 jaw pinch strength by 3lb to improve performance in lifting and grasping tasks.     Patient to improve QuickDASH score to at or below 30% to increase independency in ADLs and IADLs.     Treatment Performed: (\"NP\" = Not Performed)     Therapeutic Exercise: 20 mins  - Re-assessed objective measurements  - Discussed goal achievement and POC  - Reviewed HEP exercises   - Discussed gradually returning back to ADLs and IADLs  - Discussed the importance of rest for healing     Therapeutic Activities: NP    Manual Therapy: 15 mins  - STM, scar tissue massage, and graston to L 3rd and 4th digit  - Mobilization and stretches to all joints of L 3rd and 4th digit    Neuromuscular Re-Education: NP  -   Modalities: 10 mins  - Fluidotherapy with wrist and digit AROM     Education: Reviewed home exercise program.  "

## 2024-08-01 ENCOUNTER — TREATMENT (OUTPATIENT)
Dept: OCCUPATIONAL THERAPY | Facility: CLINIC | Age: 59
End: 2024-08-01
Payer: COMMERCIAL

## 2024-08-01 DIAGNOSIS — S60.222A CONTUSION OF LEFT HAND, INITIAL ENCOUNTER: ICD-10-CM

## 2024-08-01 PROCEDURE — 97022 WHIRLPOOL THERAPY: CPT | Mod: GO

## 2024-08-01 PROCEDURE — 97110 THERAPEUTIC EXERCISES: CPT | Mod: GO

## 2024-08-01 PROCEDURE — 97140 MANUAL THERAPY 1/> REGIONS: CPT | Mod: GO

## 2024-08-01 ASSESSMENT — PAIN SCALES - GENERAL: PAINLEVEL_OUTOF10: 2

## 2024-08-01 ASSESSMENT — PAIN - FUNCTIONAL ASSESSMENT: PAIN_FUNCTIONAL_ASSESSMENT: 0-10

## 2024-08-02 NOTE — PROGRESS NOTES
OCCUPATIONAL THERAPY TREATMENT NOTE    Patient Name:  Rambo Gerber   Patient MRN: [id number]  Date: 8/5/2024  Time Calculation  Start Time: 1030  Stop Time: 1115  Time Calculation (min): 45 min    Insurance:  Visit number: 7 of 12  Insurance Type: Payor: [employer] / Plan: [employer] / Product Type: *No Product type* /   Authorization or Plan of Care date Range: 06/21-08/02/24 extended to 09/13/24  Copay: n/a  Referred by: Sumeet Turk APRN-*   Approved diagnosis: S60.222A        OT Therapeutic Procedures Time Entry  Manual Therapy Time Entry: 15  Therapeutic Activity Time Entry: 15  OT Modalities Time Entry  Whirlpool Time Entry: 15                  General:  Reason for visit: L hand pain   Referred by: Sumeet Turk APRN-*      Assessment:  Progress towards functional goals:  HEP compliance, Improved mobility in L 3rd and 4th digit, Improved performance in daily activities , and Reduce pain level  Response to interventions:  Decreased scar tissue build up noted in L IV proximal phalanx. Patient notes decreased tightness after IASTM. Increased activity tolerance noted as patient performed  and 302 with increased resistance. Patient tolerated BTE program with minimal soreness in digits. , Patient tolerated therapeutic exercises well and without any adverse events., Improved tolerance to strengthening progressions., and Tolerated increased resistance during  & 302  Justification for continued skilled care: To address remaining functional, objective and subjective deficits to allow them to return to full independence with ADLs. Modify and progress home exercise program. Skilled intervention required to improve ROM which will improve function. Reduce pain to improve function.    Plan:  Progress exercises as tolerated to improve overall UE strength and performance in daily activities , Therapeutic exercises to  "strengthen L hand for functional use in daily activities. , Exercises to gradually increase range of motion., Manual therapy to  muscle tightness and improve joint mobility. , and Modalities as needed to address symptoms.    Therapy diagnoses:   1. Contusion of left hand, initial encounter  Follow Up In Occupational Therapy            Subjective:  Rambo reports he feels like his condition is improving.  Progress towards functional goal:   Patient reports he has noticed significant improvements in pain and function of affected digits since being off of work. Patient believes this break has been beneficial for his healing process. Notes patient has been resting his hands all weekend. , Improved performance in daily activities , Reduce pain level, and Reduce muscle tightness  Pain Location L 3rd and 4th digit  Pain (0-10): 1   HEP adherence / understanding: compliance with the instructed home exercises.    Precautions:  Fall Risk: None    Precautions listed: none    Objective: NP    Treatment Performed: (\"NP\" = Not Performed)     Therapeutic Exercise: NP    Therapeutic Activities: 15 mins  -  6.6T; 120 secs   -  4.3T; 120 secs  -  4T; 120 secs  -   trials 13.3T, 15.3T, 17.3T; 90 secs each     Manual Therapy: 15 mins  - STM, scar tissue massage, and graston to L 3rd and 4th digit  - Mobilization and stretches to all joints of L 3rd and 4th digit    Neuromuscular Re-Education: NP  -   Modalities: 15 mins  - Fluidotherapy with wrist and digit AROM     Education: Reviewed home exercise program.  "

## 2024-08-05 ENCOUNTER — TREATMENT (OUTPATIENT)
Dept: OCCUPATIONAL THERAPY | Facility: CLINIC | Age: 59
End: 2024-08-05
Payer: COMMERCIAL

## 2024-08-05 DIAGNOSIS — S60.222A CONTUSION OF LEFT HAND, INITIAL ENCOUNTER: ICD-10-CM

## 2024-08-05 PROCEDURE — 97022 WHIRLPOOL THERAPY: CPT | Mod: GO

## 2024-08-05 PROCEDURE — 97530 THERAPEUTIC ACTIVITIES: CPT | Mod: GO

## 2024-08-05 PROCEDURE — 97140 MANUAL THERAPY 1/> REGIONS: CPT | Mod: GO

## 2024-08-05 ASSESSMENT — PAIN - FUNCTIONAL ASSESSMENT: PAIN_FUNCTIONAL_ASSESSMENT: 0-10

## 2024-08-05 ASSESSMENT — PAIN SCALES - GENERAL: PAINLEVEL_OUTOF10: 1

## 2024-08-06 NOTE — PROGRESS NOTES
OCCUPATIONAL THERAPY TREATMENT NOTE    Patient Name:  Rambo Gerber   Patient MRN: [id number]  Date: 2024       Insurance:  Visit number:   Insurance Type: Payor: [employer] / Plan: [employer] / Product Type: *No Product type* /   Authorization or Plan of Care date Range: -24 extended to 24  Copay: n/a  Referred by: Sumeet Turk APRN-*   Approved diagnosis: S60.222A                              General:  Reason for visit: L hand pain   Referred by: Sumeet Turk APRN-*      Assessment:  Progress towards functional goals:  HEP compliance, Improved mobility in L 3rd and 4th digit, Improved performance in daily activities , and Reduce pain level  Response to interventions:  Decreased scar tissue build up noted in L IV proximal phalanx. Patient notes decreased tightness after IASTM. Increased activity tolerance noted as patient performed  and 302 with increased resistance. Patient tolerated BTE program with minimal soreness in digits. , Patient tolerated therapeutic exercises well and without any adverse events., Improved tolerance to strengthening progressions., and Tolerated increased resistance during  & 302  Justification for continued skilled care: To address remaining functional, objective and subjective deficits to allow them to return to full independence with ADLs. Modify and progress home exercise program. Skilled intervention required to improve ROM which will improve function. Reduce pain to improve function.    Plan:  Progress exercises as tolerated to improve overall UE strength and performance in daily activities , Therapeutic exercises to strengthen L hand for functional use in daily activities. , Exercises to gradually increase range of motion., Manual therapy to  muscle tightness and improve joint mobility. , and Modalities as needed to address  "symptoms.    Therapy diagnoses:   No diagnosis found.        Subjective:  Rambo reports he feels like his condition is improving.  Progress towards functional goal:   Patient reports he has noticed significant improvements in pain and function of affected digits since being off of work. Patient believes this break has been beneficial for his healing process. Notes patient has been resting his hands all weekend. , Improved performance in daily activities , Reduce pain level, and Reduce muscle tightness  Pain Location L 3rd and 4th digit  Pain (0-10): 1   HEP adherence / understanding: compliance with the instructed home exercises.    Precautions:  Fall Risk: None    Precautions listed: none    Objective: NP    Treatment Performed: (\"NP\" = Not Performed)     Therapeutic Exercise: NP    Therapeutic Activities: 15 mins  -  6.6T; 120 secs   -  4.3T; 120 secs  -  4T; 120 secs  -   trials 13.3T, 15.3T, 17.3T; 90 secs each     Manual Therapy: 15 mins  - STM, scar tissue massage, and graston to L 3rd and 4th digit  - Mobilization and stretches to all joints of L 3rd and 4th digit    Neuromuscular Re-Education: NP  -   Modalities: 15 mins  - Fluidotherapy with wrist and digit AROM     Education: Reviewed home exercise program.  "

## 2024-08-08 ENCOUNTER — APPOINTMENT (OUTPATIENT)
Dept: OCCUPATIONAL THERAPY | Facility: CLINIC | Age: 59
End: 2024-08-08
Payer: COMMERCIAL

## 2024-08-09 ENCOUNTER — DOCUMENTATION (OUTPATIENT)
Dept: OCCUPATIONAL THERAPY | Facility: CLINIC | Age: 59
End: 2024-08-09
Payer: COMMERCIAL

## 2024-08-09 NOTE — PROGRESS NOTES
Occupational Therapy                 Therapy Communication Note    Patient Name: Rambo Gerber  MRN: [id number]  Today's Date: 8/9/2024     Discipline: Occupational Therapy    Missed Time: Cancel    Comment: Patient's wife called to cancel today's appointment. Therapist called patient to remind him of his OP OT appointment on 08/12/24. Patient confirmed appointment.

## 2024-08-12 ENCOUNTER — TREATMENT (OUTPATIENT)
Dept: OCCUPATIONAL THERAPY | Facility: CLINIC | Age: 59
End: 2024-08-12
Payer: COMMERCIAL

## 2024-08-12 DIAGNOSIS — S60.222A CONTUSION OF LEFT HAND, INITIAL ENCOUNTER: ICD-10-CM

## 2024-08-12 PROCEDURE — 97530 THERAPEUTIC ACTIVITIES: CPT | Mod: GO

## 2024-08-12 PROCEDURE — 97140 MANUAL THERAPY 1/> REGIONS: CPT | Mod: GO

## 2024-08-12 PROCEDURE — 97022 WHIRLPOOL THERAPY: CPT | Mod: GO

## 2024-08-12 ASSESSMENT — PAIN SCALES - GENERAL: PAINLEVEL_OUTOF10: 2

## 2024-08-12 ASSESSMENT — PAIN - FUNCTIONAL ASSESSMENT: PAIN_FUNCTIONAL_ASSESSMENT: 0-10
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
## 2024-08-14 NOTE — PROGRESS NOTES
OCCUPATIONAL THERAPY TREATMENT NOTE    Patient Name:  Rambo Gerber   Patient MRN: [id number]  Date: 8/15/2024  Time Calculation  Start Time: 1217  Stop Time: 1302  Time Calculation (min): 45 min    Insurance:  Visit number: 9 of 12  Insurance Type: Payor: [employer] / Plan: [employer] / Product Type: *No Product type* /   Authorization or Plan of Care date Range: 06/21-08/02/24 extended to 09/13/24  Copay: n/a  Referred by: Sumeet Turk APRN-*   Approved diagnosis: S60.222A        OT Therapeutic Procedures Time Entry  Manual Therapy Time Entry: 15  Therapeutic Activity Time Entry: 15  OT Modalities Time Entry  Whirlpool Time Entry: 15                  General:  Reason for visit: L hand pain   Referred by: Sumeet Turk APRN-*      Assessment:  Progress towards functional goals:  HEP compliance, Improved mobility in L 3rd and 4th digit, Improved performance in daily activities , and Reduce pain level  Response to interventions:  Decreased scar tissue build up noted in proximal phalanx of L 3rd and 4th digit. AROM is WNL of affected digits. Patient continued to tolerate BTE program without adverse reactions. , Patient tolerated therapeutic exercises well and without any adverse events., Improved tolerance to strengthening progressions., and Tolerated increased resistance during , 302, 601, & 162.  Justification for continued skilled care: To address remaining functional, objective and subjective deficits to allow them to return to full independence with ADLs. Modify and progress home exercise program. Skilled intervention required to improve ROM which will improve function. Reduce pain to improve function.    Plan:  Progress exercises as tolerated to improve overall UE strength and performance in daily activities , Therapeutic exercises to strengthen L hand for functional use in daily activities. , Exercises to  "gradually increase range of motion., Manual therapy to  muscle tightness and improve joint mobility. , and Modalities as needed to address symptoms.    Therapy diagnoses:   1. Contusion of left hand, initial encounter  Follow Up In Occupational Therapy          Subjective:  Rambo reports he feels like his condition is improving.  Progress towards functional goal:   Patient reports he has been completing yard work at the [employer], resulting in soreness in his digits. Notes the pain will decrease by night. , Improved performance in daily activities , and Reduce pain level  Pain Location L 3rd and 4th digit  Pain (0-10): 2   HEP adherence / understanding: compliance with the instructed home exercises.    Precautions:  Fall Risk: None    Precautions listed: none    Objective: NP    Treatment Performed: (\"NP\" = Not Performed)     Therapeutic Exercise: NP    Therapeutic Activities: 15 mins  -  8.3T; 120 secs   -  6.3T; 120 secs  -  6T; 120 secs  -   trials 30%, 40%, 50%; 90 secs each     Manual Therapy: 15 mins  - STM, scar tissue massage, and graston to L 3rd and 4th digit  - Mobilization and stretches to all joints of L 3rd and 4th digit    Neuromuscular Re-Education: NP  -   Modalities: 15 mins  - Fluidotherapy with wrist and digit AROM     Education: Reviewed home exercise program.  "

## 2024-08-15 ENCOUNTER — TREATMENT (OUTPATIENT)
Dept: OCCUPATIONAL THERAPY | Facility: CLINIC | Age: 59
End: 2024-08-15
Payer: COMMERCIAL

## 2024-08-15 DIAGNOSIS — S60.222A CONTUSION OF LEFT HAND, INITIAL ENCOUNTER: Primary | ICD-10-CM

## 2024-08-15 PROCEDURE — 97022 WHIRLPOOL THERAPY: CPT | Mod: GO

## 2024-08-15 PROCEDURE — 97140 MANUAL THERAPY 1/> REGIONS: CPT | Mod: GO

## 2024-08-15 PROCEDURE — 97530 THERAPEUTIC ACTIVITIES: CPT | Mod: GO

## 2024-08-15 ASSESSMENT — PAIN SCALES - GENERAL: PAINLEVEL_OUTOF10: 2

## 2024-08-15 ASSESSMENT — PAIN - FUNCTIONAL ASSESSMENT: PAIN_FUNCTIONAL_ASSESSMENT: 0-10

## 2024-08-19 ENCOUNTER — TREATMENT (OUTPATIENT)
Dept: OCCUPATIONAL THERAPY | Facility: CLINIC | Age: 59
End: 2024-08-19
Payer: COMMERCIAL

## 2024-08-19 DIAGNOSIS — S60.222A CONTUSION OF LEFT HAND, INITIAL ENCOUNTER: ICD-10-CM

## 2024-08-19 PROCEDURE — 97140 MANUAL THERAPY 1/> REGIONS: CPT | Mod: GO

## 2024-08-19 PROCEDURE — 97022 WHIRLPOOL THERAPY: CPT | Mod: GO

## 2024-08-19 PROCEDURE — 97530 THERAPEUTIC ACTIVITIES: CPT | Mod: GO

## 2024-08-19 ASSESSMENT — PAIN SCALES - GENERAL: PAINLEVEL_OUTOF10: 2

## 2024-08-19 ASSESSMENT — PAIN - FUNCTIONAL ASSESSMENT: PAIN_FUNCTIONAL_ASSESSMENT: 0-10

## 2024-08-19 NOTE — PROGRESS NOTES
OCCUPATIONAL THERAPY TREATMENT NOTE    Patient Name:  Rambo eGrber   Patient MRN: [id number]  Date: 8/19/2024  Time Calculation  Start Time: 0900  Stop Time: 0941  Time Calculation (min): 41 min    Insurance:  Visit number: 10 of 12  Insurance Type: Payor: [employer] / Plan: [employer] / Product Type: *No Product type* /   Authorization or Plan of Care date Range: 06/21-08/02/24 extended to 09/13/24  Copay: n/a  Referred by: Sumeet Turk APRN-*   Approved diagnosis: S60.222A        OT Therapeutic Procedures Time Entry  Manual Therapy Time Entry: 15  Therapeutic Activity Time Entry: 11  OT Modalities Time Entry  Whirlpool Time Entry: 15                  General:  Reason for visit: L hand pain   Referred by: Sumeet Turk APRN-*      Assessment:  Progress towards functional goals:  HEP compliance, Improved mobility in L 3rd and 4th digit, Improved performance in daily activities , and Reduce pain level  Response to interventions:  Mild increased of scar tissue noted on proximal phalanx of L 3rd digit. AROM continues to be WNL. Patient requested not to complete BTE activities d/t soreness. Demonstrated proper form during fine motor skills., Patient tolerated therapeutic exercises well and without any adverse events., and Demonstrated increased fine motor control during therapeutic activities.   Justification for continued skilled care: To address remaining functional, objective and subjective deficits to allow them to return to full independence with ADLs. Modify and progress home exercise program. Skilled intervention required to improve ROM which will improve function. Reduce pain to improve function.    Plan:  Progress exercises as tolerated to improve overall UE strength and performance in daily activities , Therapeutic exercises to strengthen L hand for functional use in daily activities. , Exercises to gradually increase  "range of motion., Manual therapy to  muscle tightness and improve joint mobility. , and Modalities as needed to address symptoms.    Therapy diagnoses:   1. Contusion of left hand, initial encounter  Follow Up In Occupational Therapy          Subjective:  Rambo reports he feels like his condition has stayed the same.  Progress towards functional goal:   Patient reports he has been sore since last session. Notes most of the soreness was in the PIPJs of digits III and IV. Patient notes he did not complete much yard work this weekend., Improved performance in daily activities , and Reduce pain level  Pain Location L 3rd and 4th digit  Pain (0-10): 2   HEP adherence / understanding: compliance with the instructed home exercises.    Precautions:  Fall Risk: None    Precautions listed: none    Objective: NP    Treatment Performed: (\"NP\" = Not Performed)     Therapeutic Exercise: NP    Therapeutic Activities: 11 mins  - Pinch, place, and remove peg dominos with alternating digits  - Pinch, place, and remove pegs with tweezers in square formation on [place] Pegboard    Manual Therapy: 15 mins  - STM, scar tissue massage, and graston to L 3rd and 4th digit  - Mobilization and stretches to all joints of L 3rd and 4th digit    Neuromuscular Re-Education: NP  -   Modalities: 15 mins  - Fluidotherapy with wrist and digit AROM     Education: Reviewed home exercise program.  "

## 2024-08-22 ENCOUNTER — APPOINTMENT (OUTPATIENT)
Dept: OCCUPATIONAL THERAPY | Facility: CLINIC | Age: 59
End: 2024-08-22
Payer: COMMERCIAL

## 2024-08-28 NOTE — PROGRESS NOTES
OCCUPATIONAL THERAPY TREATMENT NOTE    Patient Name:  Rambo Gerber   Patient MRN: [id number]  Date: 8/29/2024  Time Calculation  Start Time: 0800  Stop Time: 0838  Time Calculation (min): 38 min    Insurance:  Visit number: 11 of 12  Insurance Type: Payor: [employer] / Plan: [employer] / Product Type: *No Product type* /   Authorization or Plan of Care date Range: 06/21-08/02/24 extended to 09/13/24  Copay: n/a  Referred by: Sumeet Turk APRN-*   Approved diagnosis: S60.222A        OT Therapeutic Procedures Time Entry  Manual Therapy Time Entry: 15  Therapeutic Exercise Time Entry: 8  OT Modalities Time Entry  Whirlpool Time Entry: 15                  General:  Reason for visit: L hand pain   Referred by: Sumeet Turk APRN-*      Assessment:  Progress towards functional goals:  HEP compliance, Improved mobility in L 3rd and 4th digit, Improve  and pinch strength, and Improved performance in daily activities   Response to interventions:  Objective measurements re-assessed indicating increased AROM in digits III and IV and increased /pinch strength. Full extension achieved in both affected digits. Improved QuickDASH score indicates increased participation of ADLs, IADLs, and work tasks. Patient has achieved 2/3 new goals since re-check on 08/01/24. Patient will be discharged from OT has he has demonstrated functional use of L digits and has decreased pain. Therapist reviewed HEP exercises and pain reduction strategies. Therapist also informed patient that total healing time for an injury such as his, is 6-12 months and to expect some stiffness and discomfort throughout this time. Recommended applying heat to hand when pain increases. Patient verbally agreed to discharge plan.   Justification for continued skilled care: To address remaining functional, objective and subjective deficits to allow them to return to full  independence with ADLs. Modify and progress home exercise program. Skilled intervention required to improve ROM which will improve function. Reduce pain to improve function.    Plan:  Discharge from occupational therapy    Therapy diagnoses:   1. Contusion of left hand, initial encounter  Follow Up In Occupational Therapy        Subjective:  Rambo reports he feels like his condition has stayed the same.  Progress towards functional goal:   Patient reports the pain in his digits has changed. He had difficulties explaining the pain but states his digits are stiff.  and Improved performance in daily activities   Pain Location L 3rd and 4th digit  Pain (0-10): 2   HEP adherence / understanding: compliance with the instructed home exercises.    Precautions:  Fall Risk: None    Precautions listed: none    Objective: 07/02/24, 08/01/24  Active range of motion:  L Wrist:  - Flexion: 70 (+5)  - Extension: 55 (+5)  - Rd dev: 20 (+5)  - Ul dev: 30 (-5)     L 3rd Digit:  - MCP ext: 0 (+10)  - MCP flex: 85 (no change)  - PIP ext: 0 (+5)  - PIP flex: 103 (+1)  - DIP ext: +9 (no change)  - DIP flex: 65 (+4)     L 4th Digit:  - MCP ext: 0 (+10)  - MCP flex: 83 (+1)  - PIP ext: 0 (+6)  - PIP flex: 106 (-4)  - DIP ext: 0 (no change)  - DIP flex: 71 (+9)      strength:  - RUE : 64# (-11#)  - LUE : 49# (+5#)     Pinch Strength:  - Lateral:       - RUE: 15# (+#1)       - LUE: 23.5# (+10.5#)  - 3 Jaw:       - RUE: 17.5# (-2.5#)       - LUE: 14.5# (+1.5#)     Outcome Measures:  OT Adult Other Outcome Measures  Other Outcome Measures: 19 (QuickDASH) (16 point improvement)   (18.18%)     Goals:  Patient to increase L 3rd digit MCPJ extension AROM by 5 degrees in order to improve independent performance in daily activities. Met     Patient will improve L 3 jaw pinch strength by 3lb to improve performance in lifting and grasping tasks. Progressing     Patient to improve QuickDASH score to at or below 30% to increase independency  "in ADLs and IADLs. Met    Treatment Performed: (\"NP\" = Not Performed)     Therapeutic Exercise: 8 mins  - Re-assessed objective measurements  - Discussed goal achievement and discharge   - Reviewed HEP exercises     Therapeutic Activities: NP    Manual Therapy: 15 mins  - STM, scar tissue massage, and graston to L 3rd and 4th digit  - Mobilization and stretches to all joints of L 3rd and 4th digit    Neuromuscular Re-Education: NP  -   Modalities: 15 mins  - Fluidotherapy with wrist and digit AROM     Education: Reviewed home exercise program.  "

## 2024-08-29 ENCOUNTER — APPOINTMENT (OUTPATIENT)
Dept: OCCUPATIONAL THERAPY | Facility: CLINIC | Age: 59
End: 2024-08-29
Payer: COMMERCIAL

## 2024-08-29 DIAGNOSIS — S60.222A CONTUSION OF LEFT HAND, INITIAL ENCOUNTER: ICD-10-CM

## 2024-08-29 PROCEDURE — 97140 MANUAL THERAPY 1/> REGIONS: CPT | Mod: GO

## 2024-08-29 PROCEDURE — 97110 THERAPEUTIC EXERCISES: CPT | Mod: GO

## 2024-08-29 PROCEDURE — 97022 WHIRLPOOL THERAPY: CPT | Mod: GO

## 2024-08-29 ASSESSMENT — PAIN SCALES - GENERAL: PAINLEVEL_OUTOF10: 2

## 2024-08-29 ASSESSMENT — PAIN - FUNCTIONAL ASSESSMENT: PAIN_FUNCTIONAL_ASSESSMENT: 0-10

## 2024-09-03 ENCOUNTER — APPOINTMENT (OUTPATIENT)
Dept: OCCUPATIONAL THERAPY | Facility: CLINIC | Age: 59
End: 2024-09-03
Payer: COMMERCIAL

## 2024-09-06 ENCOUNTER — EVALUATION (OUTPATIENT)
Dept: PHYSICAL THERAPY | Facility: CLINIC | Age: 59
End: 2024-09-06
Payer: COMMERCIAL

## 2024-09-06 DIAGNOSIS — S29.012A STRAIN OF MUSCLE AND TENDON OF BACK WALL OF THORAX, INITIAL ENCOUNTER: Primary | ICD-10-CM

## 2024-09-06 PROCEDURE — 97161 PT EVAL LOW COMPLEX 20 MIN: CPT | Mod: GP | Performed by: INTERNAL MEDICINE

## 2024-09-06 NOTE — PROGRESS NOTES
"PHYSICAL THERAPY EVALUATION AND TREATMENT    Patient Name: Rambo Gerber \"Esteban\"  MRN: [id number]  Today's Date: 9/6/2024  Time Calculation  Start Time: 0932  Stop Time: 1015  Time Calculation (min): 43 min    Insurance:  Visit number: 1 (updated 09/06/24)  Insurance Type: Payor: [employer] / Plan: [employer] / Product Type: *No Product type* /   C9 PT 12V 8/14-9/30/24   Referred by: Sumeet Turk APRN-*   DIAG: S29.012A     PT Evaluation Time Entry  PT Evaluation (Low) Time Entry: 38  PT Therapeutic Procedures Time Entry  Therapeutic Exercise Time Entry: 5                     Assessment:  PT Assessment Results: Decreased strength, Decreased range of motion, Pain  Rehab Prognosis: Good  Evaluation/Treatment Tolerance: Patient tolerated treatment well, Patient limited by pain    Rambo Gerber  is a 59 y.o. old patient who participated in a physical therapy evaluation today due to ongoing lower thoracic pain. Pt presents with signs and symptoms consistent with B thoracic paraspinal strain. Pt has positive response to extension and postural correction. His impairments include: postural deficits, tenderness, strength deficits, pain, swelling, ROM deficits, motor control deficits. Due to these impairments, he has the following functional limitations and participation restrictions: difficulty with sleeping, stair negotiation, transfers, performing household/recreational/occupational activities, and performing some ADLs. Skilled physical therapy services are appropriate and beneficial in order to achieve measurable and meaningful change in the above objective tests and measures. Utilization of skilled physical therapy services will aid in advancing his functional status and attaining his therapy-related goals. The patient verbalized understanding and is in agreement with all goals and plan of care.  Plan of care was developed with input and agreement by the patient    Plan: " "  Treatment/Interventions: Aquatic therapy, Blood flow restriction therapy, Cryotherapy, Dry needling, Education/ Instruction, Electrical stimulation, Manual therapy, Neuromuscular re-education, Self care/ home management, Taping techniques, Therapeutic activities, Therapeutic exercises, Ultrasound  PT Plan: Skilled PT  PT Frequency: 1 time per week  Duration: 6 weeks  Onset Date: 05/23/24  Certification Period Start Date: 08/14/24  Certification Period End Date: 09/30/24  Number of Treatments Authorized: 12  Rehab Potential: Good  Plan of Care Agreement: Patient    NV: assess response to HEP and progress as fanny with trial of thoracic mobility and stretching/flexibility. Back school and postural re-education. Trial of manual tx and e-stim    Therapy Diagnosis:   1. Strain of muscle and tendon of back wall of thorax, initial encounter  Referral to Physical Therapy    Follow Up In Physical Therapy          Subjective    Pt goes by \"Esteban\"    Onset: 05/23/24    WALTER: Patient was at work when a marble sink tipped over and landed on his left second through fifth digits. States he is still able to move the hand though has had swelling and pain. Pt fell backwards on to back; denies hitting head or LOC. Pt reports pain in back persisted despite pain management with pain meds. Pt now endorses 4/10 back pain currently at rest. Pt denies any more recent falls after the one at work. Pt reports intermittent numbness and tingling down LEs.    Pain location:  R and L lower thoracic paraspinals; radiates across back when painful.  Pain description:  deep, discomfort, harsh   9/10 at worst; 4-5/10 at best    Worse with:  prolonged sitting, prolonged standing, work-related activities yard work  Better with:  rest, alternating between Tylenol and Advil    Prior treatments/interventions:  OT for L hand/wrist, none for back    Home: One-story home with 1-2 ANGEL with no Hrs. 14 steps to basement with HR.  PLOF/CLOF: Independent with all " "mobility, ADLs, and iADLs without AD  Functional limitations: prolonged sitting, prolonged laying, bending fwd, work-related tasks, lower body dressing, lifting, stair negotiation  Pt's goal: \" basically build up the strength again I guess. Reduce the pain to where it is tolerable\"     Work: PRN supervisor for maintenance to [employer]  Exercise: none  Hobbies: baseball games    Diagnostic images:   CT chest abdomen pelvis w IV contrast   CT thoracic spine wo IV contrast   CT lumbar spine wo IV contrast 05/23/24    IMPRESSION:  No acute abnormality of the chest, abdomen and pelvis.      No acute fracture or traumatic subluxation of the thoracic and lumbar  spine.      Medical History Form: Reviewed (scanned into chart)    Precautions:  Fall Risk: None   +Night sweats since fall; unsure if anxiety related. Might be more pain related    Denies: CA, pacemaker, DM, HTN, blood thinner medication use, latex allergy, epilepsy/seizures, or other known cardio/neuro/pulmonary problems   Denies unexpected weight loss/gain or night pain.    Previous surgeries include:  none    Objective   Outcome Measures:  Other Measures  Oswestry Disablity Index (SANTI): 32%    Observation: Pt reports when he sits up, he can produce more force through LE MMT. Suspect pt holding back from producing full force during LE MMT due to fear of pain and discomfort  Gait: WFL without device  Posture: fair, forward head, rounded shoulders  Correction of posture: sx's better     Neurological Findings:  Reflexes:  patellar  (R) 1+, (L) 1+    Dermatomes/Sensation Testing:  (L2) Anterior Thigh:  intact  (L3) Medial Knee:  intact  (L4) Medial Malleolus:  intact  (L5) Dorsal Second Toe Web Space: intact  (S1) Lateral Malleolus:  intact    Myotomes/Strength Testing (MMT in sitting):  (L2) Hip Flex (R/L):  4-/5, 4-/5  (L3) Knee Ext (R/L): 4+/5, 5/5  Knee Flex (R/L): 4/5, 4/5  (L4) Ankle DF (R/L): 4/5 (cog-wheeling), 4/5 (cog-wheeling)  (L5) GTE (R/L): 4/5, 4/5  (S1) " Ankle PF (R/L):  5/5,     Special Tests:  Slump R/L:  NT / NT  Active SLR R/L: neg/ neg    Lumbar ROM/Pretest Symptoms Standing:  FIS: min limitation; sx's worse  RFIS: NT  EIS: mod limitation; sx's worse  SARA: 10x; sx's better     Trunk SB R/L: no limitation; sx's worse / no limitation; sx's worse  Trunk Rotation R/L: mild limitation; sx's worse / mod limitation; sx's worse    Palpation: TTP to lower thoracic paraspinals, and lower thoracic spinous processes and upper lumbar spinout processes       KEY  NT = not tested this visit  p! = pain  ~ = approximation      Treatments:  Therapeutic Exercise  Access Code: [email or web address]  - Supine Lower Trunk Rotation  - 1-2 x daily - 7 x weekly - 1-2 sets - 10 reps - 5 second hold  - Hooklying Single Knee to Chest Stretch  - 1-2 x daily - 7 x weekly - 1-2 sets - 10 reps - 5 second hold  **Declines further there-ex performance due to pain**        EDUCATION:  Home exercise program instructed and issued. Answered questions about home exercise program. Provided manual cues for correct performance of exercises. Provided verbal feedback to improve exercise technique. Objective exam findings. POC.    Goals:  ST weeks  1. Patient independent with home exercise program to progress current functional status    LTGs: 6 weeks  1.  Improve Oswestry score to 0-19% impairment to indicate a significant improvement in overall lumbar perception of disability.  2.  Decrease complaints of pain by 80% at minimum of evaluation rating, 4 of 7 days a week at minimum.  3. Patient will demonstrate improvements in sitting and standing posture without cueing from therapist during 45 minute session to allow for improved tolerances for spinal loading.  4.  Patient will demonstrate appropriate and safe body mechanics with work related and/or clinical activities to manage pain and prevent further injury  5.  Increase LE strength by 0.5 to 1 manual testing grade to improve overall functional mobility for  IADLS and postural control.    6.  Increase spine AROM to </= mild limitations as appropriate in each plane, to improve functional mobility tolerances for IADLS.

## 2024-09-09 ENCOUNTER — TREATMENT (OUTPATIENT)
Dept: PHYSICAL THERAPY | Facility: CLINIC | Age: 59
End: 2024-09-09
Payer: COMMERCIAL

## 2024-09-09 DIAGNOSIS — S29.012A STRAIN OF MUSCLE AND TENDON OF BACK WALL OF THORAX, INITIAL ENCOUNTER: Primary | ICD-10-CM

## 2024-09-09 PROCEDURE — 97140 MANUAL THERAPY 1/> REGIONS: CPT | Mod: GP | Performed by: INTERNAL MEDICINE

## 2024-09-09 PROCEDURE — 97530 THERAPEUTIC ACTIVITIES: CPT | Mod: GP | Performed by: INTERNAL MEDICINE

## 2024-09-09 PROCEDURE — 97110 THERAPEUTIC EXERCISES: CPT | Mod: GP | Performed by: INTERNAL MEDICINE

## 2024-09-09 NOTE — PROGRESS NOTES
"PHYSICAL THERAPY TREATMENT    Patient Name: Rambo Gerber \"Esteban\"  MRN: [id number]  Today's Date: 9/9/2024  Time Calculation  Start Time: 1047  Stop Time: 1131  Time Calculation (min): 44 min    Insurance:  Visit number: 2 (updated 09/09/24)  Insurance Type: Payor: [employer] / Plan: [employer] / Product Type: *No Product type* /   C9 PT 12V 8/14-9/30/24   Referred by: Sumeet Turk APRN-*   DIAG: S29.012A        PT Therapeutic Procedures Time Entry  Manual Therapy Time Entry: 10  Therapeutic Exercise Time Entry: 24  Therapeutic Activity Time Entry: 10                     Assessment:  Progress towards functional goals: improved HEP and body mechanics knowledge. Decreased familiar pain.  Response to interventions: Pt receptive to body mechanics education and use of lumbar roll to address pain with prolonged sitting; administered back school booklet. Noted pt soft tissue restriction superior near mid thoracic paraspinals vs lower paraspinals compared to initial eval, L > R; pt reports pain is now more sore but improved since beginning of session. Pt more limited to L thoracic rotation vs to the R; would benefit from trial of joint mobs NV; added thoracic rotation to HEP. Denies increase in pain at end of session.  Justification for continued skilled care: to address pain and mm tightness/restriction limiting participation in work and recreational activities.    Plan:   NV: assess response to manual tx and repeat if beneficial. Monitor response to HEP update and progress as fanny. Trial of e-stim if no responding/no long last relief    Therapy Diagnosis:   1. Strain of muscle and tendon of back wall of thorax, initial encounter  Follow Up In Physical Therapy          Subjective    Pt goes by \"Esteban\"    Endorses 4/10 mid back pain today. Pt reports modifying activity, decreasing work load with jia this morning prior to pt session. Pt reports attempting HEP with good stretch felt    Precautions: " " Fall Risk: None   +Night sweats since fall; unsure if anxiety related. Might be more pain related    Denies: CA, pacemaker, DM, HTN, blood thinner medication use, latex allergy, epilepsy/seizures, or other known cardio/neuro/pulmonary problems   Denies unexpected weight loss/gain or night pain.    Previous surgeries include:  none      Treatments:  Therapeutic Activity:  - Body mechanics education with emphasis on use of lumbar roll with prolonged sitting and avoid repetitive fwd bending. Administered back school booklet for home    Manual Therapy:  Pt R s/l:  - STM/DTM and myofascial cupping to mid thoracic paraspinals, L > R    Therapeutic Exercise  Access Code: [email or web address]  - Nu-step lvl 4 x5 mins; LE warm-up and subjective   - Rhomboid stretch 3x20\" each side   - S/l thoracic rotation with open book 10x each side     Verbally reviewed only:  - Supine Lower Trunk Rotation  - 1-2 x daily - 7 x weekly - 1-2 sets - 10 reps - 5 second hold  - Hooklying Single Knee to Chest Stretch  - 1-2 x daily - 7 x weekly - 1-2 sets - 10 reps - 5 second hold  **Declines further there-ex performance due to pain**      EDUCATION:  - cues/rationale for there-ex and manual tx session  - HEP update and hand-out  "

## 2024-09-13 NOTE — PROGRESS NOTES
"PHYSICAL THERAPY TREATMENT    Patient Name: Rambo Gerber \"Esteban\"  MRN: [id number]  Today's Date: 9/16/2024  Time Calculation  Start Time: 0750  Stop Time: 0850  Time Calculation (min): 60 min    Insurance:  Visit number: 3 (updated 09/16/24)  Insurance Type: Payor: [employer] / Plan: [employer] / Product Type: *No Product type* /   C9 PT 12V 8/14-9/30/24   Referred by: Sumeet Turk, APRN-*   DIAG: S29.012A        PT Therapeutic Procedures Time Entry  Manual Therapy Time Entry: 26  Therapeutic Exercise Time Entry: 14  PT Modalities Time Entry  E-Stim (Unattended) Time Entry: 20                  Assessment:  Progress towards functional goals: improved HEP and body mechanics knowledge. Decreased familiar pain.  Response to interventions: Per FDM model, using pt's body language, pt indicates folding/refolding dysfunction: trial of manual trunk distraction in sit centralized sxs.  Educated pt in self distraction for pain relief. Pt also voices ?able unloaded extension bias. Will monitor response to these interventions in subsequent sessions.  Post trial of Estim, pt denied pain exiting clinic stating \"I'm numb\".    Justification for continued skilled care: to address pain and mm tightness/restriction limiting participation in work and recreational activities.    Plan:   Monitor sxs,  initiate light back strengthening as able (nv: posture presses?)  Try MET for multi level thoracic segments rotated to L?    Therapy Diagnosis:   1. 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 Might be more pain related    Denies: CA, pacemaker, DM, HTN, blood thinner medication use, latex allergy, epilepsy/seizures, or other known cardio/neuro/pulmonary problems   Denies unexpected weight loss/gain or night pain.    Previous surgeries include:  none      Treatments:  Therapeutic Activity:  -lumbar support, \"how to bend\" reviewed again 9/16.      Manual Therapy:  Prone  - STM/DTM and myofascial cupping w/ external glide  to mid thoracic paraspinals, L > R  KT tape to inhibit same region    Therapeutic Exercise  Access Code: [email or web address]  - Nu-step lvl 4 x5 mins; LE warm-up and subjective   -FDM model:  compression vs distraction: seated trunk distraction provided relief and centralized sxs: pt to trial PRN (hang off bed or on door frame) and monitor sxs   -Stand trunk flexion/extension: equally painful  -prone lie/prop: reduced pain: pt to perform PRN and monitor sxs    NP, can resume nv:  - Rhomboid stretch 3x20\" each side   - S/l thoracic rotation with open book 10x each side   - Supine Lower Trunk Rotation  - 1-2 x daily - 7 x weekly - 1-2 sets - 10 reps - 5 second hold  - Hooklying Single Knee to Chest Stretch  - 1-2 x daily - 7 x weekly - 1-2 sets - 10 reps - 5 second hold    Modalities  Prone IFC erick cross set up thru mid T spine, static mode, 15 min plus set up    EDUCATION:  - cues/rationale for tx interventions  - HEP updated verbally w/ trunk distraction vs ZACKARY  The rationale for kinesiotape application was discussed with patient.  Pt was advised that the tape will generally stay on for 2-3 days.  If skin irritation, such as redness or itching were to occur, Pt was advised to remove the tape and gently wash the adhesive off.  Pt agreed to kinesiotaping.   "

## 2024-09-16 ENCOUNTER — TREATMENT (OUTPATIENT)
Dept: PHYSICAL THERAPY | Facility: CLINIC | Age: 59
End: 2024-09-16
Payer: COMMERCIAL

## 2024-09-16 DIAGNOSIS — S29.012A STRAIN OF MUSCLE AND TENDON OF BACK WALL OF THORAX, INITIAL ENCOUNTER: Primary | ICD-10-CM

## 2024-09-16 PROCEDURE — 97110 THERAPEUTIC EXERCISES: CPT | Mod: GP,CQ

## 2024-09-16 PROCEDURE — 97140 MANUAL THERAPY 1/> REGIONS: CPT | Mod: GP,CQ

## 2024-09-16 PROCEDURE — 97014 ELECTRIC STIMULATION THERAPY: CPT | Mod: GP,CQ

## 2024-09-25 NOTE — PROGRESS NOTES
"PHYSICAL THERAPY TREATMENT    Patient Name: Rambo Gerber \"Esteban\"  MRN: [id number]  Today's Date: 9/26/2024  Time Calculation  Start Time: 0945  Stop Time: 1030  Time Calculation (min): 45 min    Insurance:  Visit number: 4 (updated 09/26/24)  Insurance Type: Payor: [employer] / Plan: [employer] / Product Type: *No Product type* /   C9 PT 12V 8/14-9/30/24   Referred by: Sumeet Turk, MARGARITO-*   DIAG: S29.012A        PT Therapeutic Procedures Time Entry  Manual Therapy Time Entry: 26  Therapeutic Exercise Time Entry: 19                     Assessment:  Progress towards functional goals: improved pt knowledge of pain mgmt techniques and body mechanics knowledge. Decreased pain intensity mid back  Response to interventions:   Feel extension bias present  Pt able to reduce mid back pain w/ trunk extension and self trunk distraction I.    MOD tightness w/ decreased PA mobility of mid to lower T spine evident.  Increased kyphosis mid T spine evident as well.  Manual therapy performed to address, pt reported \"pressure\" during PA mobs.  With return to standing position, pain 5/10, however pt reported \"I felt it was good to get moving again\".  Issued supine lie over towel roll as self thoracic extension mobilization. Will monitor effect of this in subsequent sessions.  Good recall of ergonomic principles upon review today  Justification for continued skilled care: to address pain and mm tightness/restriction limiting participation in work and recreational activities.    Plan:   Monitor sxs,  initiate light back strengthening as able (nv: posture presses?)  Monitor effect manual therapy had on pain/function    Therapy Diagnosis:   1. Strain of muscle and tendon of back wall of thorax, initial encounter  Follow Up In Physical Therapy          Subjective    Pt goes by \"Esteban\"  Pt voices moderate mid back pain still limits him from sitting comfortably, or helping at [employer]: attempting to pull several light " "branches across the ground on tarp.  Pt had to discontinue activity on attempt.  Pt does voice most relief w/ standing lumbar extension, as well as self traction technique by hanging from door frame.  KT tape: NE, + skin irritation on removal  ESTIM: some relief for several hours, then pt felt pain was worse   Endorses 4/10  radiating across mid back   HEP: yes    Precautions:  Fall Risk: None   +Night sweats since fall; unsure if anxiety related. Might be more pain related    Denies: CA, pacemaker, DM, HTN, blood thinner medication use, latex allergy, epilepsy/seizures, or other known cardio/neuro/pulmonary problems   Denies unexpected weight loss/gain or night pain.    Previous surgeries include:  none      Treatments:  Therapeutic Activity:  -lumbar support, \"how to bend, lift\" reviewed again 9/16, 9/26  -future session: can review back book in full     Manual Therapy:  Prone  - STM/DTM and myofascial cupping w/ external glide mid to lower thoracic paraspinals,   -prone grade 1/2 PA mobs mid to lower T spine  KT tape :  NE and + skin irritation w/ removal:  Discharge    Therapeutic Exercise  Access Code: [email or web address]  - Nu-step lvl 4 x5 mins; LE warm-up and subjective   -most relief reported w/ self distraction of trunk and standing lumbar extension  -supine lie over towel roll parallel w/ spine, with shld flexion (hands laced) 20x  Nv: initiate postural strengthening    Reviewed:  - Rhomboid stretch 3x20\" each side   - Supine Lower Trunk Rotation  - 1-2 x daily - 7 x weekly - 1-2 sets - 10 reps - 5 second hold    Reviewed, pt reports NE on pain w/ these, can discharge:  - S/l thoracic rotation with open book 10x each side   - Hooklying Single Knee to Chest Stretch  - 1-2 x daily - 7 x weekly - 1-2 sets - 10 reps - 5 second hold    Modalities: WITHELD, DUE TO NO SIGNIFICANT RELIEF      EDUCATION:  - cues/rationale for tx interventions  -reviewed ergonomics w/ pt  - HEP updated verbally w/supine lie over towel roll   "

## 2024-09-26 ENCOUNTER — TREATMENT (OUTPATIENT)
Dept: PHYSICAL THERAPY | Facility: CLINIC | Age: 59
End: 2024-09-26
Payer: COMMERCIAL

## 2024-09-26 DIAGNOSIS — S29.012A STRAIN OF MUSCLE AND TENDON OF BACK WALL OF THORAX, INITIAL ENCOUNTER: Primary | ICD-10-CM

## 2024-09-26 PROCEDURE — 97110 THERAPEUTIC EXERCISES: CPT | Mod: GP,CQ

## 2024-09-26 PROCEDURE — 97140 MANUAL THERAPY 1/> REGIONS: CPT | Mod: GP,CQ

## 2024-09-27 NOTE — PROGRESS NOTES
"PHYSICAL THERAPY TREATMENT + RE-ASSESSMENT NOTE    Patient Name: Rambo Gerber \"Esteban\"  MRN: [id number]  Today's Date: 9/30/2024  Time Calculation  Start Time: 0801  Stop Time: 0850  Time Calculation (min): 49 min    Insurance:  Visit number: 5 (updated 09/30/24)  Insurance Type: Payor: [employer] MCO / Plan: [employer] MCO / Product Type: *No Product type* /   C9 PT 12V 8/14-9/30/24   Referred by: Sumeet Turk APRN-*   DIAG: S29.012A        PT Therapeutic Procedures Time Entry  Therapeutic Activity Time Entry: 49                     Assessment:  Rambo Gerber has completed 5 sessions of physical therapy treatment with emphasis upon addressing lower thoracic pain. He demonstrates improving symptoms with slightly less active and resting pain but LLE numbness and tingling remains. His RLE strength has improved compared to initial evaluation along with his spinal mobility. However, pain is still present with personal care, lifting, walking, prolonged sitting and standing, sleeping, traveling, social life, and employment/home making. Currently, Rambo Gerber is making progress towards all established PT POC goals at this time. he would continue to benefit from skilled PT services to address his remaining impairments to restore his PLOF/activity tolerance. Recommend he continue with PT treatment 1x per week for 7 visits. Rambo Gerber voiced agreement and satisfaction with this plan.    Plan:   Monitor increasing frequency of extension exercises and effect on LLE sxs,  initiate light back strengthening as able, back book in full    Therapy Diagnosis:   1. Strain of muscle and tendon of back wall of thorax, initial encounter  Follow Up In Physical Therapy            Subjective    Pt goes by \"Esteban\"  Pt reports increased pain x3 days from work and now getting better. Pt reports exercises exacerbate pain more. 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 Decreased pain with sitting " with corrected posture. Relief with decompression stretches.     Endorses 3-4/10  radiating across mid back   HEP: yes    Precautions:  Fall Risk: None   +Night sweats since fall; unsure if anxiety related. Might be more pain related  Denies: CA, pacemaker, DM, HTN, blood thinner medication use, latex allergy, epilepsy/seizures, or other known cardio/neuro/pulmonary problems   Denies unexpected weight loss/gain or night pain.    Previous surgeries include:  none    Objective   Outcome Measures:  Other Measures  Oswestry Disablity Index (SANTI): 32% --> 60%     Observation: Pt reports when he sits up, he can produce more force through LE MMT. Suspect pt holding back from producing full force during LE MMT due to fear of pain and discomfort --> Pt reports tingling down in to L calf currently.  Gait: WFL without device --> same  Posture: fair, forward head, rounded shoulders --> same  Correction of posture: sx's better --> ne on sx's      Neurological Findings:  Reflexes:  patellar  (R) 1+, (L) 1+ --> NT     Dermatomes/Sensation Testing:  (L2) Anterior Thigh:  intact --> same  (L3) Medial Knee:  intact --> same  (L4) Medial Malleolus:  intact --> same  (L5) Dorsal Second Toe Web Space: intact --> same  (S1) Lateral Malleolus:  intact --> same; pt reports increased throbbing     Myotomes/Strength Testing (MMT in sitting):  (L2) Hip Flex (R/L):  4-/5, 4-/5 --> 4+/5 , 4-/5   (L3) Knee Ext (R/L): 4+/5, 5/5 --> 5/5 , 5/5   Knee Flex (R/L): 4/5, 4/5 --> 4/5 , 4-/5 p!   (L4) Ankle DF (R/L): 4/5 (cog-wheeling), 4/5 (cog-wheeling) --> 4+/5 , 4/5   (L5) GTE (R/L): 4/5, 4/5 --> same  (S1) Ankle PF (R/L):  5/5, 5/5 --> same     Special Tests:  Slump R/L:  NT / NT --> neg / pos  Active SLR R/L: neg/ neg --> NT / NT      Lumbar ROM/Pretest Symptoms Standing:  FIS: min limitation; sx's worse --> same  RFIS: NT --> same  EIS: mod limitation; sx's worse --> min limitation; sx's better  SARA: 10x; sx's better --> 20x; sx's better    "  Trunk SB R/L: no limitation; sx's worse / no limitation; sx's worse --> no limitation; sx's / no limitation; ne on sx's   Trunk Rotation R/L: mild limitation; sx's worse / mod limitation; sx's worse --> mild limitation; sx's worse / mild limitation:      Palpation: TTP to lower thoracic paraspinals, and lower thoracic spinous processes and upper lumbar spinout processes --> TTP to L-sided thoracic paraspinals, upper R-sided thoracic paraspinals, and spinous processes of T11-L2 or 3        KEY  NT = not tested this visit  p! = pain  ~ = approximation      Treatments:  Therapeutic Activity:  - Completed RA this session. Please see objective and goals sections for more details.    The following were NC this session:   -lumbar support, \"how to bend, lift\" reviewed again ,   -future session: can review back book in full     Manual Therapy: NC  Prone  - STM/DTM and myofascial cupping w/ external glide mid to lower thoracic paraspinals,   -prone grade 1/2 PA mobs mid to lower T spine  KT tape :  NE and + skin irritation w/ removal:  Discharge    Therapeutic Exercise: NC   Access Code: [email or web address]  - Nu-step lvl 4 x5 mins; LE warm-up and subjective   -most relief reported w/ self distraction of trunk and standing lumbar extension  -supine lie over towel roll parallel w/ spine, with shld flexion (hands laced) 20x  Nv: initiate postural strengthening    Reviewed:  - Rhomboid stretch 3x20\" each side   - Supine Lower Trunk Rotation  - 1-2 x daily - 7 x weekly - 1-2 sets - 10 reps - 5 second hold    Reviewed, pt reports NE on pain w/ these, can discharge:  - S/l thoracic rotation with open book 10x each side   - Hooklying Single Knee to Chest Stretch  - 1-2 x daily - 7 x weekly - 1-2 sets - 10 reps - 5 second hold    Modalities: WITHELD, DUE TO NO SIGNIFICANT RELIEF      EDUCATION:  - objective exam findings  - HEP update and hand-out  - POC update with cont PT for remaining 7 visits      Goals:  ST weeks  1. Patient " independent with home exercise program to progress current functional status  - 09/30/24: met; 1x/day     LTGs: 6 weeks  1.  Improve Oswestry score to 0-19% impairment to indicate a significant improvement in overall lumbar perception of disability.  - 09/30/24: partially met    2.  Decrease complaints of pain by 80% at minimum of evaluation rating, 4 of 7 days a week at minimum.  - 09/30/24: partially met; 60-70%    3. Patient will demonstrate improvements in sitting and standing posture without cueing from therapist during 45 minute session to allow for improved tolerances for spinal loading.  - 09/30/24: met    4.  Patient will demonstrate appropriate and safe body mechanics with work related and/or clinical activities to manage pain and prevent further injury  - 09/30/24: met    5.  Increase LE strength by 0.5 to 1 manual testing grade to improve overall functional mobility for IADLS and postural control.    - 09/30/24: partially met    6.  Increase spine AROM to </= mild limitations as appropriate in each plane, to improve functional mobility tolerances for IADLS.  - 09/30/24: partially met

## 2024-09-30 ENCOUNTER — TREATMENT (OUTPATIENT)
Dept: PHYSICAL THERAPY | Facility: CLINIC | Age: 59
End: 2024-09-30
Payer: COMMERCIAL

## 2024-09-30 DIAGNOSIS — S29.012A STRAIN OF MUSCLE AND TENDON OF BACK WALL OF THORAX, INITIAL ENCOUNTER: ICD-10-CM

## 2024-09-30 PROCEDURE — 97530 THERAPEUTIC ACTIVITIES: CPT | Mod: GP | Performed by: INTERNAL MEDICINE

## 2024-10-07 NOTE — PROGRESS NOTES
"PHYSICAL THERAPY TREATMENT + RE-ASSESSMENT NOTE    Patient Name: Rambo Gerber \"Esteban\"  MRN: [id number]  Today's Date: 10/8/2024  Time Calculation  Start Time: 0950  Stop Time: 1032  Time Calculation (min): 42 min    Insurance:  Visit number: 6 (updated 10/08/24)  Insurance Type: Payor: [employer] / Plan: [employer] M[employer] / Product Type: *No Product type* /   C9 PT 12V 8/14-9/30/24 : extended thru 11/1  Referred by: Sumeet Turk APRN-*   DIAG: S29.012A        PT Therapeutic Procedures Time Entry  Manual Therapy Time Entry: 13  Therapeutic Exercise Time Entry: 15  Therapeutic Activity Time Entry: 14                     Assessment:  -new report of upper Thoracic pain today, which was slightly reproduced w/ cervical flexion and alleviated by cervical extension.  Updated HEP w/ repeated cervical extension and incorporated DNF w/ all postural ex to address.  -pt receptive towards all back and neck school concepts today.  Emphasizing trial of lumbar or thoracic support w/ prolonged sit/drive.  Extension bias still present.  -per subjective reports, pain is reducing and centralizing  -Pt tolerated mild postural strengthening ex progression, not exceeding 10 reps w/ pain 4/10, however, pain reduced w/ manual therapy at end of  tx.    Plan:   Monitor sxs w/ HEP progression, and being aware of body mechanics  Monitor upper thoracic pain w/ trial of cervical extension    Therapy Diagnosis:   1. Strain of muscle and tendon of back wall of thorax, initial encounter  Follow Up In Physical Therapy            Subjective    Pt goes by \"Esteban\"  Pt reports reduced pain due to \"taking it easy\": no laboring at [employer].  Sitting or sleeping still slightly worsens sxs.  Endorses 3/10  \"straight down spine\" mid T spine, however, on 10/8 pt also reporting pain T2-4 region  HEP: yes    Precautions:  Fall Risk: None   +Night sweats since fall; unsure if anxiety related. Might be more pain related  Denies: CA, pacemaker, DM, " HTN, blood thinner medication use, latex allergy, epilepsy/seizures, or other known cardio/neuro/pulmonary problems   Denies unexpected weight loss/gain or night pain.    Previous surgeries include:  none    Objective         KEY  NT = not tested this visit  p! = pain  ~ = approximation      Treatments:  Therapeutic Activity:  - reviewed back book in full 10/8    Manual Therapy:   Prone looking through hole:   - STM/DTM CTJ>> lower thoracic paraspinals    KT tape :  NE and + skin irritation w/ removal:  Discharge    Therapeutic Exercise:   Access Code: [email or web address]  - Nu-step lvl 4 x5 mins;  NP  -most relief reported w/ self distraction of trunk and standing lumbar extension    Access Code: [email or web address]  - Doorway Rhomboid Stretch  - 1 x daily - 7 x weekly - 3-5 sets - 20-30 second hold  - Standing Lumbar Extension  - 5-12 x daily - 7 x weekly - 3 sets - 10 reps  - Seated Cervical Extension AROM  - 3-6 x daily - 7 x weekly - 3-5 reps    GTB: 10 reps only on 10/8  - Standing Row with Resistance  - 1 x daily - 3 x weekly - 1-2 sets - 10 reps  - Shoulder extension with resistance - Neutral  - 1 x daily - 3 x weekly - 1-2 sets - 10 reps  - Seated Shoulder Horizontal Abduction with Resistance - Palms Down  - 1 x daily - 3 x weekly - 1-2 sets - 10 reps  - Standing Shoulder External Rotation with Resistance  - 1 x daily - 3 x weekly - 1-2 sets - 10 reps    Progression ideas: plinth planks, plinth or quadruped bird dogs, bent over scapular strengthening    Modalities: WITHELD, DUE TO NO SIGNIFICANT RELIEF    EDUCATION:  HEP condensed, updated. Ex cues.  Back/neck school.

## 2024-10-08 ENCOUNTER — TREATMENT (OUTPATIENT)
Dept: PHYSICAL THERAPY | Facility: CLINIC | Age: 59
End: 2024-10-08
Payer: COMMERCIAL

## 2024-10-08 DIAGNOSIS — S29.012A STRAIN OF MUSCLE AND TENDON OF BACK WALL OF THORAX, INITIAL ENCOUNTER: Primary | ICD-10-CM

## 2024-10-08 PROCEDURE — 97530 THERAPEUTIC ACTIVITIES: CPT | Mod: GP,CQ

## 2024-10-08 PROCEDURE — 97140 MANUAL THERAPY 1/> REGIONS: CPT | Mod: GP,CQ

## 2024-10-08 PROCEDURE — 97110 THERAPEUTIC EXERCISES: CPT | Mod: GP,CQ

## 2024-10-15 ENCOUNTER — TREATMENT (OUTPATIENT)
Dept: PHYSICAL THERAPY | Facility: CLINIC | Age: 59
End: 2024-10-15
Payer: COMMERCIAL

## 2024-10-15 DIAGNOSIS — S29.012A STRAIN OF MUSCLE AND TENDON OF BACK WALL OF THORAX, INITIAL ENCOUNTER: Primary | ICD-10-CM

## 2024-10-15 PROCEDURE — 97110 THERAPEUTIC EXERCISES: CPT | Mod: GP | Performed by: PHYSICAL THERAPIST

## 2024-10-15 PROCEDURE — 97140 MANUAL THERAPY 1/> REGIONS: CPT | Mod: GP | Performed by: PHYSICAL THERAPIST

## 2024-10-15 NOTE — PROGRESS NOTES
"PHYSICAL THERAPY TREATMENT + RE-ASSESSMENT NOTE    Patient Name: Rambo Gerber \"Esteban\"  MRN: [id number]  Today's Date: 10/15/2024  Time Calculation  Start Time: 1205  Stop Time: 1245  Time Calculation (min): 40 min    Insurance:  Visit number: 7 (updated 10/15/24)  Insurance Type: Payor: [employer] / Plan: [employer] / Product Type: *No Product type* /   C9 PT 12V 8/14-9/30/24 : extended thru 11/1  Referred by: Sumeet Turk APRN-*   DIAG: S29.012A        PT Therapeutic Procedures Time Entry  Manual Therapy Time Entry: 30  Therapeutic Exercise Time Entry: 10                     Therapy Diagnosis:   1. 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    Not taking meds or as needed due to the effect on his liver.     01/10  \"straight down spine\" mid T spine, however, on 10/8 pt also reporting pain T2-4 region.  No longer employed by company which his worker's comp is through.    Also reports pain increases to 6-7/10 after sitting in a restaurant after doing activities at a [employer].   Sitting is still tough.  Noted patient sitting slumped w/ phone in lap and head down in the waiting room.      HEP: yes ;   standing extension doing 3x  Hobbies:  baseball games    Precautions:  Fall Risk: None   +Night sweats since fall; unsure if anxiety related. Might be more pain related  Denies: CA, pacemaker, DM, HTN, blood thinner medication use, latex allergy, epilepsy/seizures, or other known cardio/neuro/pulmonary problems   Denies unexpected weight loss/gain or night pain.    Objective   Myotomes/Strength Testing (MMT in sitting):  (L2) Hip Flex (R/L):  4-/5, 4-/5  (L3) Knee Ext (R/L): 4+/5, 5/5  Knee Flex (R/L): 4/5, 4/5  (L4) Ankle DF (R/L): 4/5 (cog-wheeling), 4/5 " (cog-wheeling)  (L5) GTE (R/L): 4/5, 4/5  (S1) Ankle PF (R/L):  5/5, 5/5     KEY  NT = not tested this visit  p! = pain  ~ = approximation      Treatments:    reviewed back book in full on 10/8    Therapeutic Exercise:   Access Code: [email or web address]  -most relief reported w/ self distraction of trunk and standing lumbar extension  Wall hip hinge squat  Golfers lift  Prone thoracic press up  Prone thoracic press up w/ sag  Prone thoracic press up  w/ sag and release buttocks  Access Code: [email or web address]  - Doorway Rhomboid Stretch  - 1 x daily - 7 x weekly - 3-5 sets - 20-30 second hold  - Standing Lumbar Extension  - 5-12 x daily - 7 x weekly - 3 sets - 10 reps  - Seated Cervical Extension AROM  - 3-6 x daily - 7 x weekly - 3-5 reps  GTB: 10 reps only on 10/8  - Standing Row with Resistance  - 1 x daily - 3 x weekly - 1-2 sets - 10 reps  - Shoulder extension with resistance - Neutral  - 1 x daily - 3 x weekly - 1-2 sets - 10 reps  - Seated Shoulder Horizontal Abduction with Resistance - Palms Down  - 1 x daily - 3 x weekly - 1-2 sets - 10 reps  - Standing Shoulder External Rotation with Resistance  - 1 x daily - 3 x weekly - 1-2 sets - 10 reps  Progression ideas: plinth planks, plinth or quadruped bird dogs, bent over scapular strengthening    Modalities: WITHELD, DUE TO NO SIGNIFICANT RELIEF    Manual Therapy:  prone  STM/DTM CTJ>>  thoracic paraspinals      EDUCATION:   HEP condensed, updated. Ex cues.  Back/neck school.    Assessment:  -new report of upper Thoracic pain, which was slightly reproduced w/ cervical flexion and alleviated by cervical extension.  Updated HEP w/ repeated cervical extension and incorporated DNF w/ all postural ex to address.  -pt receptive towards all back and neck school concepts today.  Emphasizing trial of lumbar or thoracic support w/ prolonged sit/drive.  Extension bias still present.  -per subjective reports, pain is reducing and centralizing:  needs to perform extension w/ greater intensity  (sag and self OP), increase frequency in order to improve reduction.  -patient needs to be compliant w/ straight back (neutral spine) w/ doing volunteer activities at [employer]  -patient needs to be compliant w/ posture.  Also works at a computer.  Patient tolerated treatment well without increased pain or complaints following treatment.     No increased pain noted post tx.      Plan:   Monitor sxs w/ HEP progression, and being aware of body mechanics  Monitor upper thoracic pain w/ trial of cervical extension

## 2024-10-21 NOTE — PROGRESS NOTES
"PHYSICAL THERAPY TREATMENT    Patient Name: Rambo Gerber \"Esteban\"  MRN: [id number]  Today's Date: 10/22/2024  Time Calculation  Start Time: 0900  Stop Time: 0940  Time Calculation (min): 40 min    Insurance:  Visit number: 8 (updated 10/22/24)  Insurance Type: Payor: [employer] / Plan: [employer] / Product Type: *No Product type* /   C9 PT 12V 8/14-9/30/24 : extended thru 11/1  Referred by: Sumeet Turk, MARGARITO-*   DIAG: S29.012A        PT Therapeutic Procedures Time Entry  Manual Therapy Time Entry: 25  Therapeutic Exercise Time Entry: 15                     Therapy Diagnosis:   1. Strain of muscle and tendon of back wall of thorax, initial encounter  Follow Up In Physical Therapy          Subjective :   Goes by \"Esteban\"  Pain:  0.5/10 , notes getting much better  Function:   Remodeling his own house and can only do HEP about a 1/3 of what i should be doing.    Notes he also works at [employer].  Doing a lot of construction type activities at this time.      HEP:  not in frequency requested.  Doing about a 1/3 of it.  Hobbies:  baseball games    Precautions:  Fall Risk: None     Objective   Myotomes/Strength Testing (MMT in sitting):  (L2) Hip Flex (R/L):  4-/5, 4-/5  (L3) Knee Ext (R/L): 4+/5, 5/5  Knee Flex (R/L): 4/5, 4/5  (L4) Ankle DF (R/L): 4/5 (cog-wheeling), 4/5 (cog-wheeling)  (L5) GTE (R/L): 4/5, 4/5     Treatments:  Therapeutic Exercise:   Access Code: [email or web address]  Wall hip hinge squat  Golfers lift  Prone thoracic press up  Prone thoracic press up w/ sag  Prone thoracic press up  w/ sag and release buttocks  Prone thoracic press ups w/ clinician OP grade 1-2 w/o pain noted  Access Code: [email or web address]  - Doorway Rhomboid Stretch  - 1 x daily - 7 x weekly - 3-5 sets - 20-30 second hold  - Standing Lumbar Extension  - 5-12 x daily - 7 x weekly - 3 sets - 10 reps  - Seated Cervical Extension AROM  - 3-6 x daily - 7 x weekly - 3-5 reps  GTB: 10 reps only on 10/8  - Standing Row with Resistance  - 1 x " daily - 3 x weekly - 1-2 sets - 10 reps  - Shoulder extension with resistance - Neutral  - 1 x daily - 3 x weekly - 1-2 sets - 10 reps  - Seated Shoulder Horizontal Abduction with Resistance - Palms Down  - 1 x daily - 3 x weekly - 1-2 sets - 10 reps  - Standing Shoulder External Rotation with Resistance  - 1 x daily - 3 x weekly - 1-2 sets - 10 reps  Progression ideas: plinth planks, plinth or quadruped bird dogs, bent over scapular strengthening    Modalities: WITHELD, DUE TO NO SIGNIFICANT RELIEF    Manual Therapy:  prone  STM/DTM CTJ>>  thoracic paraspinals,  op w/ self sag during prone press ups - no pain noted    EDUCATION:   HEP condensed, updated. Ex cues.  Back/neck school.    Assessment:  Extension bias still present.  -per subjective reports, pain is reducing and centralizing:  needs to perform extension w/ greater intensity (sag and self OP), increase frequency in order to improve reduction.  -patient needs to be compliant w/ straight back (neutral spine) w/ doing volunteer activities at [employer]  -patient needs to be compliant w/ posture.  Also works at a computer.  Patient tolerated treatment well without increased pain or complaints following treatment.    No increased pain noted post tx.    Plan:   Progress extension bias as tolerated.   Functional biomechanics may help due to patient performing extensive construction activities as home and at [employer].

## 2024-10-22 ENCOUNTER — TREATMENT (OUTPATIENT)
Dept: PHYSICAL THERAPY | Facility: CLINIC | Age: 59
End: 2024-10-22
Payer: COMMERCIAL

## 2024-10-22 DIAGNOSIS — S29.012A STRAIN OF MUSCLE AND TENDON OF BACK WALL OF THORAX, INITIAL ENCOUNTER: Primary | ICD-10-CM

## 2024-10-22 PROCEDURE — 97140 MANUAL THERAPY 1/> REGIONS: CPT | Mod: GP | Performed by: PHYSICAL THERAPIST

## 2024-10-22 PROCEDURE — 97110 THERAPEUTIC EXERCISES: CPT | Mod: GP | Performed by: PHYSICAL THERAPIST

## 2024-10-28 ENCOUNTER — TREATMENT (OUTPATIENT)
Dept: PHYSICAL THERAPY | Facility: CLINIC | Age: 59
End: 2024-10-28
Payer: COMMERCIAL

## 2024-10-28 DIAGNOSIS — S29.012A STRAIN OF MUSCLE AND TENDON OF BACK WALL OF THORAX, INITIAL ENCOUNTER: Primary | ICD-10-CM

## 2024-10-28 PROCEDURE — 97110 THERAPEUTIC EXERCISES: CPT | Mod: GP | Performed by: INTERNAL MEDICINE

## 2024-10-28 PROCEDURE — 97140 MANUAL THERAPY 1/> REGIONS: CPT | Mod: GP | Performed by: INTERNAL MEDICINE

## 2024-11-06 ENCOUNTER — APPOINTMENT (OUTPATIENT)
Dept: PHYSICAL THERAPY | Facility: CLINIC | Age: 59
End: 2024-11-06
Payer: COMMERCIAL

## 2024-11-06 DIAGNOSIS — S29.012A STRAIN OF MUSCLE AND TENDON OF BACK WALL OF THORAX, INITIAL ENCOUNTER: Primary | ICD-10-CM

## 2024-11-12 ENCOUNTER — APPOINTMENT (OUTPATIENT)
Dept: PHYSICAL THERAPY | Facility: CLINIC | Age: 59
End: 2024-11-12
Payer: COMMERCIAL

## 2024-11-12 DIAGNOSIS — S29.012A STRAIN OF MUSCLE AND TENDON OF BACK WALL OF THORAX, INITIAL ENCOUNTER: Primary | ICD-10-CM

## 2024-11-13 ENCOUNTER — TREATMENT (OUTPATIENT)
Dept: PHYSICAL THERAPY | Facility: CLINIC | Age: 59
End: 2024-11-13
Payer: COMMERCIAL

## 2024-11-13 DIAGNOSIS — S29.012A STRAIN OF MUSCLE AND TENDON OF BACK WALL OF THORAX, INITIAL ENCOUNTER: Primary | ICD-10-CM

## 2024-11-13 PROCEDURE — 97110 THERAPEUTIC EXERCISES: CPT | Mod: GP | Performed by: INTERNAL MEDICINE

## 2024-11-13 NOTE — PROGRESS NOTES
"PHYSICAL THERAPY TREATMENT    Patient Name: Rambo Gerber \"Esteban\"  MRN: [id number]  Today's Date: 11/13/2024  Time Calculation  Start Time: 0720  Stop Time: 0800  Time Calculation (min): 40 min    Insurance:  Visit number: 10 (updated 11/13/24)  Insurance Type: Payor: [employer] MCO / Plan: [employer] / Product Type: *No Product type* /   C9 PT 12V 8/14-9/30/24 : extended thru 11/22  Referred by: Sumeet Turk APRN-*   DIAG: S29.012A        PT Therapeutic Procedures Time Entry  Therapeutic Exercise Time Entry: 40                     Therapy Diagnosis:   1. Strain of muscle and tendon of back wall of thorax, initial encounter  Follow Up In Physical Therapy        Assessment:  Progress towards functional goals: improved knowledge of HEP. Improved pain.  Response to interventions: Pt demos overall decrease in pain more from decreasing work load. Cont to require education and reminder of body mechanics during prolonged sitting. Pt appropriately challenged with pelvic/trunk control with quadruped bird dogs. Requires intermittent rest breaks to perform back extension during plank progressions due to tightness but no pain reported throughout. Requires cues for scap retraction during bent-over exercises with good carry-over. Pt reports small increase in L mid to upper thoracic region (suspect from merced-scap work).   Justification for continued skilled care: Skilled physical therapy services are appropriate and beneficial in order to achieve measurable and meaningful change in the above objective tests and measures. Utilization of skilled physical therapy services will aid in advancing his functional status and attaining his therapy-related goals.       Plan:   Monitor response and compliance to HEP update; modify/consolidate PRN. Progress extension bias as tolerated.   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 Pt also reports decrease in pain when using riding  vs the push mower. Pt states he cont to have peripheralization down to top of L big toe after long car drive. Pt reports he thinks he will be ok after these next two PT sessions and his pain is at a manageable level. He is unsure of what work assignment he will get next.  HEP:  not in frequency requested.   Hobbies:  baseball games    Precautions:  Fall Risk: None      Treatments:  Therapeutic Exercise:     Access Code: [email or web address]  Prone thoracic press up 10x  Prone thoracic press up w/ sag 10x  Prone thoracic press up  w/ sag and release buttocks 10x  Supine mid and upper thoracic extension over small bolster 3x10   Quadruped alternating arm lifts 2x10   Quadruped alternating leg lifts 10x  Quadruped bird dog 2x10   Modified full plank on mat: alt shld taps 20x  Modified full plank on mat: mountain climbers 20x  Standing back extensions 2x10  Modified elbow side plank on mat: thoracic rotation 10x each side  Bent-over rows 5# 2x10 each side   Bent-over shld extension 5# 2x10 each side    The following were NC this session:   Self massage to thoracic paraspinals with golf ball on wall x2 mins each side  Wall hip hinge squat  Golfers lift  - Doorway Rhomboid Stretch  - 1 x daily - 7 x weekly - 3-5 sets - 20-30 second hold  - Standing Lumbar Extension  - 5-12 x daily - 7 x weekly - 3 sets - 10 reps  - Seated Cervical Extension AROM  - 3-6 x daily - 7 x weekly - 3-5 reps  GTB: 10 reps only on 10/8  - Standing Row with Resistance  - 1 x daily - 3 x weekly - 1-2 sets - 10 reps  - Shoulder extension with resistance - Neutral  - 1 x daily - 3 x weekly - 1-2 sets - 10 reps  - Seated Shoulder Horizontal Abduction with Resistance - Palms Down  - 1 x daily - 3 x weekly - 1-2 sets - 10 reps  - Standing Shoulder External Rotation with Resistance  - 1 x daily " - 3 x weekly - 1-2 sets - 10 reps      Modalities: NC  WITHELD, DUE TO NO SIGNIFICANT RELIEF    Manual Therapy:  NC  Pt prone:  STM/DTM and myofascial cupping with external gliding to thoracic and upper lumbar paraspinals      KEY  NC = not completed this visit   ! = pain  ~ = approximation  // = parallel  RA = re-assessment  NV = next visit    EDUCATION:     - reminder to utilize lumbar roll during longer car rides and for upright posture and lunging when raking   - cues/rationale for there-ex   - HEP update and hand-out

## 2024-11-14 NOTE — PROGRESS NOTES
"PHYSICAL THERAPY TREATMENT    Patient Name: Rambo Gerber \"Esteban\"  MRN: [id number]  Today's Date: 11/15/2024  Time Calculation  Start Time: 0850  Stop Time: 0942  Time Calculation (min): 52 min    Insurance:  Visit number: 11 (updated 11/15/24)  Insurance Type: Payor: [employer] / Plan: [employer] / Product Type: *No Product type* /   C9 PT 12V 8/14-9/30/24 : extended thru 11/22  Referred by: Sumeet Turk APRN-*   DIAG: S29.012A        PT Therapeutic Procedures Time Entry  Manual Therapy Time Entry: 22  Therapeutic Exercise Time Entry: 30                     Therapy Diagnosis:   1. Strain of muscle and tendon of back wall of thorax, initial encounter  Follow Up In Physical Therapy        Assessment:  Progress towards functional goals: improved knowledge of HEP. Improved pain.  Response to interventions: Trunk stability appears appropriately challenged w/ current ex in stand and quadruped.  Ex cues required mainly for correct DNF m. Activation.  Excellent relief reported w/ manual therapy to treat mod palpable tightness entire R thoracic >>QL regions.  See education section below.  Justification for continued skilled care: Skilled physical therapy services are appropriate and beneficial in order to achieve measurable and meaningful change in the above objective tests and measures. Utilization of skilled physical therapy services will aid in advancing his functional status and attaining his therapy-related goals.       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   HEP:  not in frequency requested, especially due to subjective reports     Precautions:  Fall Risk: None      Treatments:  Therapeutic Exercise:     Access Code: [email or web address]  Prone thoracic press up 10x  Prone thoracic press up w/ sag 10x  Prone thoracic press up  w/ sag and release " buttocks 10x  Supine mid and upper thoracic extension over small bolster 3x10   Quadruped alternating arm lifts 2x10   Quadruped alternating leg lifts 10x  Quadruped bird dog 2x10   Modified full plank on mat: alt shld taps 20x  Modified full plank on mat: mountain climbers 20x  Standing back extensions 2x10  Modified elbow side plank on mat: thoracic rotation 10x each side  Bent-over rows 5# 2x10 each side   Bent-over shld extension 5# 2x10 each side    The following were NC this session:   Self massage to thoracic paraspinals with golf ball on wall x2 mins each side  Wall hip hinge squat  Golfers lift  - Doorway Rhomboid Stretch  - 1 x daily - 7 x weekly - 3-5 sets - 20-30 second hold  - Standing Lumbar Extension  - 5-12 x daily - 7 x weekly - 3 sets - 10 reps  - Seated Cervical Extension AROM  - 3-6 x daily - 7 x weekly - 3-5 reps  GTB: 10 reps only on 10/8  - Standing Row with Resistance  - 1 x daily - 3 x weekly - 1-2 sets - 10 reps  - Shoulder extension with resistance - Neutral  - 1 x daily - 3 x weekly - 1-2 sets - 10 reps  - Seated Shoulder Horizontal Abduction with Resistance - Palms Down  - 1 x daily - 3 x weekly - 1-2 sets - 10 reps  - Standing Shoulder External Rotation with Resistance  - 1 x daily - 3 x weekly - 1-2 sets - 10 reps      Modalities: NC  WITHELD, DUE TO NO SIGNIFICANT RELIEF    Manual Therapy:    Pt prone:  DTM and myofascial cupping with external gliding to R thoracic and upper lumbar paraspinals, R QL      KEY  NC = not completed this visit   ! = pain  ~ = approximation  // = parallel  RA = re-assessment  NV = next visit    EDUCATION:     - reminder to stretch pre work shift, perform repeated movements PRN to self manage pain  - ex cues, especially for DNF

## 2024-11-15 ENCOUNTER — TREATMENT (OUTPATIENT)
Dept: PHYSICAL THERAPY | Facility: CLINIC | Age: 59
End: 2024-11-15
Payer: COMMERCIAL

## 2024-11-15 DIAGNOSIS — S29.012A STRAIN OF MUSCLE AND TENDON OF BACK WALL OF THORAX, INITIAL ENCOUNTER: Primary | ICD-10-CM

## 2024-11-15 PROCEDURE — 97140 MANUAL THERAPY 1/> REGIONS: CPT | Mod: GP,CQ

## 2024-11-15 PROCEDURE — 97110 THERAPEUTIC EXERCISES: CPT | Mod: GP,CQ

## 2024-11-18 ENCOUNTER — APPOINTMENT (OUTPATIENT)
Dept: PHYSICAL THERAPY | Facility: CLINIC | Age: 59
End: 2024-11-18
Payer: COMMERCIAL

## 2024-11-18 DIAGNOSIS — S29.012A STRAIN OF MUSCLE AND TENDON OF BACK WALL OF THORAX, INITIAL ENCOUNTER: Primary | ICD-10-CM

## 2024-11-20 ENCOUNTER — APPOINTMENT (OUTPATIENT)
Dept: PHYSICAL THERAPY | Facility: CLINIC | Age: 59
End: 2024-11-20
Payer: COMMERCIAL

## 2024-11-20 DIAGNOSIS — S29.012A STRAIN OF MUSCLE AND TENDON OF BACK WALL OF THORAX, INITIAL ENCOUNTER: Primary | ICD-10-CM

## 2024-11-20 PROCEDURE — 97110 THERAPEUTIC EXERCISES: CPT | Mod: GP | Performed by: INTERNAL MEDICINE

## 2024-11-20 PROCEDURE — 97530 THERAPEUTIC ACTIVITIES: CPT | Mod: GP | Performed by: INTERNAL MEDICINE

## 2024-11-20 NOTE — PROGRESS NOTES
"PHYSICAL THERAPY TREATMENT + RE-ASSESSMENT + DC NOTE    Patient Name: Rambo Gerber \"Esteban\"  MRN: [id number]  Today's Date: 11/20/2024  Time Calculation  Start Time: 0715  Stop Time: 0745  Time Calculation (min): 30 min    Insurance:  Visit number: 12 (updated 11/20/24)  Insurance Type: Payor: [employer] / Plan: [employer] / Product Type: *No Product type* /   C9 PT 12V 8/14-9/30/24 : extended thru 11/22  Referred by: Sumeet Turk APRN-*   DIAG: S29.012A        PT Therapeutic Procedures Time Entry  Therapeutic Exercise Time Entry: 7  Therapeutic Activity Time Entry: 23                     Therapy Diagnosis:   1. Strain of muscle and tendon of back wall of thorax, initial encounter  Follow Up In Physical Therapy        Assessment:  Rambo Gerber has completed 5 sessions of physical therapy treatment with emphasis upon addressing L > R mid to lower thoracic and lumbar pain. He demonstrates improving symptoms with less active and resting pain and decreased intensity of LLE numbness and tingling sx's. His BLE strength and comfort with MMT has improved compared to initial evaluation along with his trunk ROM and MODQI score from most recent RA. Currently, Rambo Gerber has met the majority of all established PT POC goals at this time. Recommend DC with HEP. Rambo Gerber voiced agreement and satisfaction with this plan.       Plan:   DC with HEP      Subjective :   Goes by \"Esteban\"  Pain:  3.5/10 R mid to lower thoracic  Function: Pt reports pain and sx's are manageable and tolerable at this time. N/T still occurs down LLE down and in to bottom of foot and in to big toe; pt confirms present currently but decreased in intensity. Pt completed extensions at work yesterday with improvement in pain.  HEP:  not in frequency requested, especially due to subjective reports     Precautions:  Fall Risk: None     Objective   Outcome Measures:  Other Measures  Oswestry Disablity Index (SANTI): 32% --> 60% --> " 34%     Observation: Pt reports when he sits up, he can produce more force through LE MMT. Suspect pt holding back from producing full force during LE MMT due to fear of pain and discomfort --> Pt reports tingling down in to L calf currently. --> L lateral calf tingling and throbbing sensation in to L dorsum of foot  Gait: WFL without device --> same --> same  Posture: fair, forward head, rounded shoulders --> same --> same  Correction of posture: sx's better --> ne on sx's --> ne on sx's     Neurological Findings:  Reflexes:  patellar  (R) 1+, (L) 1+ --> NT --> NT     Dermatomes/Sensation Testing:  (L2) Anterior Thigh:  intact --> same --> same  (L3) Medial Knee:  intact --> same --> same  (L4) Medial Malleolus:  intact --> same --> same  (L5) Dorsal Second Toe Web Space: intact --> same --> L hypersensitivity, R WFL  (S1) Lateral Malleolus:  intact --> same; pt reports increased throbbing --> L hypersensitivity, R WFL     Myotomes/Strength Testing (MMT in sitting):  (L2) Hip Flex (R/L):  4-/5, 4-/5 --> 4+/5 , 4-/5 --> 5/5 , 5/5   (L3) Knee Ext (R/L): 4+/5, 5/5 --> 5/5 , 5/5 --> same  Knee Flex (R/L): 4/5, 4/5 --> 4/5 , 4-/5 p! --> 4+/5 , 4+/5 p!   (L4) Ankle DF (R/L): 4/5 (cog-wheeling), 4/5 (cog-wheeling) --> 4+/5 , 4/5 --> 4+/5 , 4+/5   (L5) GTE (R/L): 4/5, 4/5 --> same --> NT  (S1) Ankle PF (R/L):  5/5, 5/5 --> same --> same     Special Tests:  Slump R/L:  NT / NT --> neg / pos --> same    Lumbar ROM/Pretest Symptoms Standing:  FIS: min limitation; sx's worse --> same --> no limitation; sx's better  RFIS: NT --> same --> same  EIS: mod limitation; sx's worse --> min limitation; sx's better --> min limitation; sx's worse at this point  SARA: 10x; sx's better --> 20x; sx's better --> same     Trunk SB R/L: no limitation; sx's worse / no limitation; sx's worse --> no limitation; sx's / no limitation; ne on sx's --> no limitation; ne on sx's / no limitation; sx's worse  Trunk Rotation R/L: mild limitation; sx's  worse / mod limitation; sx's worse --> mild limitation; sx's worse / mild limitation --> no limitation; ne on sx's / no limitation; sx's worse     Palpation: TTP to lower thoracic paraspinals, and lower thoracic spinous processes and upper lumbar spinout processes --> TTP to L-sided thoracic paraspinals, upper R-sided thoracic paraspinals, and spinous processes of T11-L2 or 3 --> L-sided mid to lower thoracic and lumbar paraspinals        Treatments:  Therapeutic Activity:  - Completed RA this session. Please see objective and goals sections for more details.    Therapeutic Exercise:     Access Code: [email or web address]  - Nu-step lvl 4 x5 mins; trunk warm-up, subjective, and goal review  - Self massage to L and R thoracic and lumbar paraspinals with golf ball on wall x2 mins     The following were NC this session:   Prone thoracic press up 10x  Prone thoracic press up w/ sag 10x  Prone thoracic press up  w/ sag and release buttocks 10x  Supine mid and upper thoracic extension over small bolster 3x10   Quadruped alternating arm lifts 2x10   Quadruped alternating leg lifts 10x  Quadruped bird dog 2x10   Modified full plank on mat: alt shld taps 20x  Modified full plank on mat: mountain climbers 20x  Standing back extensions 2x10  Modified elbow side plank on mat: thoracic rotation 10x each side  Bent-over rows 5# 2x10 each side   Bent-over shld extension 5# 2x10 each side  Wall hip hinge squat  Golfers lift  - Doorway Rhomboid Stretch  - 1 x daily - 7 x weekly - 3-5 sets - 20-30 second hold  - Standing Lumbar Extension  - 5-12 x daily - 7 x weekly - 3 sets - 10 reps  - Seated Cervical Extension AROM  - 3-6 x daily - 7 x weekly - 3-5 reps  GTB: 10 reps only on 10/8  - Standing Row with Resistance  - 1 x daily - 3 x weekly - 1-2 sets - 10 reps  - Shoulder extension with resistance - Neutral  - 1 x daily - 3 x weekly - 1-2 sets - 10 reps  - Seated Shoulder Horizontal Abduction with Resistance - Palms Down  - 1 x daily - 3 x weekly  - 1-2 sets - 10 reps  - Standing Shoulder External Rotation with Resistance  - 1 x daily - 3 x weekly - 1-2 sets - 10 reps    Modalities: NC  WITHELD, DUE TO NO SIGNIFICANT RELIEF    Manual Therapy:  NC  Pt prone:  DTM and myofascial cupping with external gliding to R thoracic and upper lumbar paraspinals, R QL      KEY  NC = not completed this visit   ! = pain  ~ = approximation  // = parallel  RA = re-assessment  NV = next visit      EDUCATION:     - objective exam findings  - POC update with DC + HEP   - recommendation to switch or alternating leading leg when half-kneeling to counteract spinal movement  - instructed pt to attempt paraspinal self-massage with tennis ball vs golf ball    Goals:  ST weeks  1. Patient independent with home exercise program to progress current functional status  - 24: met; 1x/day     LTGs: 6 weeks  1.  Improve Oswestry score to 0-19% impairment to indicate a significant improvement in overall lumbar perception of disability.  - 24: partially met  - 24: partially met     2.  Decrease complaints of pain by 80% at minimum of evaluation rating, 4 of 7 days a week at minimum.  - 24: partially met; 60-70%  - 24: met      3. Patient will demonstrate improvements in sitting and standing posture without cueing from therapist during 45 minute session to allow for improved tolerances for spinal loading.  - 24: met     4.  Patient will demonstrate appropriate and safe body mechanics with work related and/or clinical activities to manage pain and prevent further injury  - 24: met     5.  Increase LE strength by 0.5 to 1 manual testing grade to improve overall functional mobility for IADLS and postural control.    - 24: partially met  - 24: met     6.  Increase spine AROM to </= mild limitations as appropriate in each plane, to improve functional mobility tolerances for IADLS.  - 24: partially met  - 24: met

## 2024-11-25 ENCOUNTER — APPOINTMENT (OUTPATIENT)
Dept: PHYSICAL THERAPY | Facility: CLINIC | Age: 59
End: 2024-11-25
Payer: COMMERCIAL

## 2025-06-05 ENCOUNTER — APPOINTMENT (OUTPATIENT)
Dept: CARDIOLOGY | Facility: HOSPITAL | Age: 60
End: 2025-06-05
Payer: OTHER GOVERNMENT

## 2025-06-05 ENCOUNTER — APPOINTMENT (OUTPATIENT)
Dept: RADIOLOGY | Facility: HOSPITAL | Age: 60
End: 2025-06-05
Payer: OTHER GOVERNMENT

## 2025-06-05 ENCOUNTER — HOSPITAL ENCOUNTER (EMERGENCY)
Facility: HOSPITAL | Age: 60
Discharge: HOME | End: 2025-06-05
Payer: OTHER GOVERNMENT

## 2025-06-05 VITALS
WEIGHT: 232 LBS | DIASTOLIC BLOOD PRESSURE: 70 MMHG | BODY MASS INDEX: 34.36 KG/M2 | OXYGEN SATURATION: 97 % | TEMPERATURE: 97.7 F | SYSTOLIC BLOOD PRESSURE: 131 MMHG | HEIGHT: 69 IN | HEART RATE: 72 BPM | RESPIRATION RATE: 16 BRPM

## 2025-06-05 DIAGNOSIS — J20.9 ACUTE BRONCHITIS, UNSPECIFIED ORGANISM: Primary | ICD-10-CM

## 2025-06-05 DIAGNOSIS — R79.89 ELEVATED SERUM CREATININE: ICD-10-CM

## 2025-06-05 LAB
ALBUMIN SERPL BCP-MCNC: 4.2 G/DL (ref 3.4–5)
ALP SERPL-CCNC: 74 U/L (ref 33–136)
ALT SERPL W P-5'-P-CCNC: 29 U/L (ref 10–52)
ANION GAP SERPL CALC-SCNC: 13 MMOL/L (ref 10–20)
AST SERPL W P-5'-P-CCNC: 25 U/L (ref 9–39)
BASOPHILS # BLD AUTO: 0.02 X10*3/UL (ref 0–0.1)
BASOPHILS NFR BLD AUTO: 0.3 %
BILIRUB SERPL-MCNC: 1.4 MG/DL (ref 0–1.2)
BNP SERPL-MCNC: 10 PG/ML (ref 0–99)
BUN SERPL-MCNC: 36 MG/DL (ref 6–23)
CALCIUM SERPL-MCNC: 8.8 MG/DL (ref 8.6–10.3)
CARDIAC TROPONIN I PNL SERPL HS: 8 NG/L (ref 0–20)
CHLORIDE SERPL-SCNC: 107 MMOL/L (ref 98–107)
CO2 SERPL-SCNC: 22 MMOL/L (ref 21–32)
CREAT SERPL-MCNC: 1.42 MG/DL (ref 0.5–1.3)
D DIMER PPP FEU-MCNC: 406 NG/ML FEU
EGFRCR SERPLBLD CKD-EPI 2021: 56 ML/MIN/1.73M*2
EOSINOPHIL # BLD AUTO: 0.16 X10*3/UL (ref 0–0.7)
EOSINOPHIL NFR BLD AUTO: 2 %
ERYTHROCYTE [DISTWIDTH] IN BLOOD BY AUTOMATED COUNT: 12 % (ref 11.5–14.5)
FLUAV RNA RESP QL NAA+PROBE: NOT DETECTED
FLUBV RNA RESP QL NAA+PROBE: NOT DETECTED
GLUCOSE SERPL-MCNC: 110 MG/DL (ref 74–99)
HCT VFR BLD AUTO: 43.6 % (ref 41–52)
HGB BLD-MCNC: 14.9 G/DL (ref 13.5–17.5)
IMM GRANULOCYTES # BLD AUTO: 0.03 X10*3/UL (ref 0–0.7)
IMM GRANULOCYTES NFR BLD AUTO: 0.4 % (ref 0–0.9)
INR PPP: 1.2 (ref 0.9–1.1)
LYMPHOCYTES # BLD AUTO: 2.23 X10*3/UL (ref 1.2–4.8)
LYMPHOCYTES NFR BLD AUTO: 27.9 %
MAGNESIUM SERPL-MCNC: 2.27 MG/DL (ref 1.6–2.4)
MCH RBC QN AUTO: 30.9 PG (ref 26–34)
MCHC RBC AUTO-ENTMCNC: 34.2 G/DL (ref 32–36)
MCV RBC AUTO: 91 FL (ref 80–100)
MONOCYTES # BLD AUTO: 0.54 X10*3/UL (ref 0.1–1)
MONOCYTES NFR BLD AUTO: 6.8 %
NEUTROPHILS # BLD AUTO: 5 X10*3/UL (ref 1.2–7.7)
NEUTROPHILS NFR BLD AUTO: 62.6 %
NRBC BLD-RTO: 0 /100 WBCS (ref 0–0)
PLATELET # BLD AUTO: 216 X10*3/UL (ref 150–450)
POTASSIUM SERPL-SCNC: 3.5 MMOL/L (ref 3.5–5.3)
PROT SERPL-MCNC: 7.2 G/DL (ref 6.4–8.2)
PROTHROMBIN TIME: 13.3 SECONDS (ref 9.8–12.4)
RBC # BLD AUTO: 4.82 X10*6/UL (ref 4.5–5.9)
RSV RNA RESP QL NAA+PROBE: NOT DETECTED
SARS-COV-2 RNA RESP QL NAA+PROBE: NOT DETECTED
SODIUM SERPL-SCNC: 138 MMOL/L (ref 136–145)
WBC # BLD AUTO: 8 X10*3/UL (ref 4.4–11.3)

## 2025-06-05 PROCEDURE — 83735 ASSAY OF MAGNESIUM: CPT | Performed by: NURSE PRACTITIONER

## 2025-06-05 PROCEDURE — 71046 X-RAY EXAM CHEST 2 VIEWS: CPT

## 2025-06-05 PROCEDURE — 2500000004 HC RX 250 GENERAL PHARMACY W/ HCPCS (ALT 636 FOR OP/ED): Performed by: NURSE PRACTITIONER

## 2025-06-05 PROCEDURE — 83880 ASSAY OF NATRIURETIC PEPTIDE: CPT | Performed by: NURSE PRACTITIONER

## 2025-06-05 PROCEDURE — 36415 COLL VENOUS BLD VENIPUNCTURE: CPT | Performed by: NURSE PRACTITIONER

## 2025-06-05 PROCEDURE — 85025 COMPLETE CBC W/AUTO DIFF WBC: CPT | Performed by: NURSE PRACTITIONER

## 2025-06-05 PROCEDURE — 2500000002 HC RX 250 W HCPCS SELF ADMINISTERED DRUGS (ALT 637 FOR MEDICARE OP, ALT 636 FOR OP/ED): Performed by: NURSE PRACTITIONER

## 2025-06-05 PROCEDURE — 94640 AIRWAY INHALATION TREATMENT: CPT

## 2025-06-05 PROCEDURE — 93005 ELECTROCARDIOGRAM TRACING: CPT

## 2025-06-05 PROCEDURE — 84484 ASSAY OF TROPONIN QUANT: CPT | Performed by: NURSE PRACTITIONER

## 2025-06-05 PROCEDURE — 96360 HYDRATION IV INFUSION INIT: CPT

## 2025-06-05 PROCEDURE — 85610 PROTHROMBIN TIME: CPT | Performed by: NURSE PRACTITIONER

## 2025-06-05 PROCEDURE — 71046 X-RAY EXAM CHEST 2 VIEWS: CPT | Performed by: RADIOLOGY

## 2025-06-05 PROCEDURE — 87637 SARSCOV2&INF A&B&RSV AMP PRB: CPT | Performed by: NURSE PRACTITIONER

## 2025-06-05 PROCEDURE — 80053 COMPREHEN METABOLIC PANEL: CPT | Performed by: NURSE PRACTITIONER

## 2025-06-05 PROCEDURE — 99285 EMERGENCY DEPT VISIT HI MDM: CPT | Mod: 25

## 2025-06-05 PROCEDURE — 85379 FIBRIN DEGRADATION QUANT: CPT | Performed by: NURSE PRACTITIONER

## 2025-06-05 RX ORDER — PREDNISONE 20 MG/1
40 TABLET ORAL DAILY
Qty: 10 TABLET | Refills: 0 | Status: SHIPPED | OUTPATIENT
Start: 2025-06-05 | End: 2025-06-10

## 2025-06-05 RX ORDER — IPRATROPIUM BROMIDE AND ALBUTEROL SULFATE 2.5; .5 MG/3ML; MG/3ML
3 SOLUTION RESPIRATORY (INHALATION) ONCE
Status: COMPLETED | OUTPATIENT
Start: 2025-06-05 | End: 2025-06-05

## 2025-06-05 RX ORDER — ALBUTEROL SULFATE 90 UG/1
2 INHALANT RESPIRATORY (INHALATION) EVERY 4 HOURS PRN
Qty: 18 G | Refills: 0 | Status: SHIPPED | OUTPATIENT
Start: 2025-06-05 | End: 2025-07-05

## 2025-06-05 RX ORDER — ALBUTEROL SULFATE 90 UG/1
2 INHALANT RESPIRATORY (INHALATION) EVERY 4 HOURS PRN
Qty: 18 G | Refills: 0 | Status: SHIPPED | OUTPATIENT
Start: 2025-06-05 | End: 2025-06-05

## 2025-06-05 RX ORDER — AZITHROMYCIN 250 MG/1
250 TABLET, FILM COATED ORAL DAILY
Qty: 4 TABLET | Refills: 0 | Status: SHIPPED | OUTPATIENT
Start: 2025-06-05 | End: 2025-06-05

## 2025-06-05 RX ORDER — AZITHROMYCIN 250 MG/1
250 TABLET, FILM COATED ORAL DAILY
Qty: 4 TABLET | Refills: 0 | Status: SHIPPED | OUTPATIENT
Start: 2025-06-05 | End: 2025-06-09

## 2025-06-05 RX ORDER — PREDNISONE 20 MG/1
60 TABLET ORAL ONCE
Status: COMPLETED | OUTPATIENT
Start: 2025-06-05 | End: 2025-06-05

## 2025-06-05 RX ORDER — AZITHROMYCIN 250 MG/1
500 TABLET, FILM COATED ORAL ONCE
Status: COMPLETED | OUTPATIENT
Start: 2025-06-05 | End: 2025-06-05

## 2025-06-05 RX ORDER — PREDNISONE 20 MG/1
40 TABLET ORAL DAILY
Qty: 10 TABLET | Refills: 0 | Status: SHIPPED | OUTPATIENT
Start: 2025-06-05 | End: 2025-06-05

## 2025-06-05 RX ADMIN — PREDNISONE 60 MG: 20 TABLET ORAL at 15:49

## 2025-06-05 RX ADMIN — IPRATROPIUM BROMIDE AND ALBUTEROL SULFATE 3 ML: .5; 3 SOLUTION RESPIRATORY (INHALATION) at 13:38

## 2025-06-05 RX ADMIN — SODIUM CHLORIDE 1000 ML: 0.9 INJECTION, SOLUTION INTRAVENOUS at 15:37

## 2025-06-05 RX ADMIN — AZITHROMYCIN DIHYDRATE 500 MG: 250 TABLET ORAL at 15:50

## 2025-06-05 ASSESSMENT — PAIN - FUNCTIONAL ASSESSMENT: PAIN_FUNCTIONAL_ASSESSMENT: 0-10

## 2025-06-05 ASSESSMENT — PAIN SCALES - GENERAL: PAINLEVEL_OUTOF10: 6

## 2025-06-05 ASSESSMENT — PAIN DESCRIPTION - LOCATION: LOCATION: CHEST

## 2025-06-05 ASSESSMENT — LIFESTYLE VARIABLES
EVER HAD A DRINK FIRST THING IN THE MORNING TO STEADY YOUR NERVES TO GET RID OF A HANGOVER: NO
HAVE YOU EVER FELT YOU SHOULD CUT DOWN ON YOUR DRINKING: NO
TOTAL SCORE: 0
EVER FELT BAD OR GUILTY ABOUT YOUR DRINKING: NO
HAVE PEOPLE ANNOYED YOU BY CRITICIZING YOUR DRINKING: NO

## 2025-06-05 ASSESSMENT — PAIN DESCRIPTION - PAIN TYPE: TYPE: ACUTE PAIN

## 2025-06-05 ASSESSMENT — PAIN DESCRIPTION - DESCRIPTORS: DESCRIPTORS: ACHING

## 2025-06-05 NOTE — ED TRIAGE NOTES
Patient states cough x2 weeks, started being phlegmy a few days ago, started having chest discomfort with coughing. Patient also concerned for possible hernia.

## 2025-06-05 NOTE — ED PROVIDER NOTES
"Limitations to History: None     HPI:      Rambo Gerber is a 60 y.o. male with male with no significant PMH presenting to ED today from home for evaluation of cough and chest pain.  2 weeks ago, the patient states his \"allergies kicked in.\"  Patient continued to have a cough that eventually produced green phlegm.  Now the patient feels short of breath even at rest.  Reports intermittent central chest pain worse with coughing.  Denies history of PE/DVT, recent travel, recent surgery, history of malignancy or use of exogenous hormones.  Denies sick contacts.  Denies fever/chills, nausea/vomiting, abdominal pain, urinary symptoms, change in bowel habits or any other complaints.  No smoking, EtOH or IV drugs.  [facility]    Additional History Obtained from: Triage/nursing notes reviewed    ------------------------------------------------------------------------------------------------------------------------------------------    VS: As documented in the triage note and EMR flowsheet from this visit were reviewed.    Physical Exam:  Gen: 60-year-old male, awake and alert, oriented x 3.  Well-nourished and hydrated.  Intermittently coughing.  Nontoxic looking.  Head/Neck: NCAT, neck w/ FROM  Eyes: EOMI, PERRL, anicteric sclerae, noninjected conjunctivae  Ears: TMs clear b/l without sign of infection  Nose: Nares patent w/o rhinorrhea  Mouth:  MMM, no OP lesions noted  Heart: RRR no MRG  Lungs: Clear bilaterally, no rales, rhonchi or wheezing.  Mild accessory  use with increased activity.  No stridor.  Abdomen: soft, NT, ND, no HSM, no palpable masses  Musculoskeletal: LM x 4.  MSPs intact.  Skin intact.  No deformities.  Neurologic: Alert, symmetrical facies, phonates clearly, moves all extremities equally, responsive to touch, ambulates normally   Skin: Pink, warm dry.  No erythema, edema or ecchymosis. No rashes noted  Psychological: calm, no " SI/HI      ------------------------------------------------------------------------------------------------------------------------------------------    Medical Decision Making: Otherwise healthy 60-year-old was evaluated at the bedside for productive cough green phlegm worsening over the last couple weeks with intermittent central chest pain.  On arrival vital signs within normal limits.  Afebrile.  Lungs are clear, patient is continuing to complain of shortness of breath and has accessory muscle use with movement just in the bed.  Differential includes is not limited to pneumonia, viral illness, PE and ACS.    Differential includes but is not limited to ACS, pneumonia, viral illness and PE.    IV established, continuous cardiac/O2 sat monitoring.  Basic labs, EKG, chest x-ray, viral swabs and walking pulse ox will be obtained.  Will be given a DuoNeb.      ED Course as of 06/05/25 1542   Thu Jun 05, 2025   1526 Laboratory studies were reviewed, no leukocytosis or evidence of anemia.  Normal electrolytes including magnesium and LFTs.  BUN 36 with a creatinine of 1.42 and GFR 56 is new for the patient, normal 1 year ago.  I suspect that the patient has not been taking fluids as he has been sick for 2 weeks.  Will be given a liter normal saline.  Normal coags.  Low suspicion for PE with a normal D-dimer.  BNP and high-sensitivity troponin are both normal.  Viral panel negative.  Chest x-ray does not show any evidence of pneumonia or other acute cardiopulmonary process.  Audible expiratory wheezing throughout all fields after breathing treatment.  Remains hemodynamically stable.  Walking pulse ox was 97%, heart rate remained in the 70s.  Diagnosis today is acute bronchitis.  Will be placed on a steroid burst to help with cough/wheeze.  Also given albuterol inhaler for cough/wheeze.  As the patient has had symptoms for 2 weeks and is coughing up green phlegm, I am concerned for development of pneumonia, will be placed  on a Z-Beau.  Loading dose given in the emergency department.  Was given a liter of saline in the emergency department, advised to continue to hydrate well.  [facility], a advised the patient that he should have his kidney function rechecked tomorrow.  Resume any normal medications.  Return precautions and red flags discussed.  All questions were entertained and answered.  Shared decision making conducted.  Patient verbalizes understanding of diagnosis and treatment plan.  Condition stable for discharge. [SB]      ED Course User Index  [SB] AMANDA Canales         Diagnoses as of 06/05/25 1542   Acute bronchitis, unspecified organism   Elevated serum creatinine       EKG interpreted by Dr. Givens at 1231, normal sinus rhythm, right bundle branch block, rate of 72, no ST segment depression or elevation consistent with ischemia or infarction.  Abnormal EKG.    Chronic Medical Conditions Significantly Affecting Care: None identified    External Records Reviewed: I reviewed recent and relevant outside records including: None    Discussion of Management with Other Providers: None    I discussed the patient/results with: None         AMANDA Canales  06/05/25 1542

## 2025-06-05 NOTE — DISCHARGE INSTRUCTIONS
You appear to have acute bronchitis.  You will be treated with steroid burst, Z-Beau and albuterol inhaler.  You were dosed with both the  Zithromax and prednisone here in the emergency department.  Those would be taken tomorrow.  Albuterol inhaler for cough/wheeze.  Your kidney function is elevated today, likely due to dehydration.  You were hydrated here.  Stay well-hydrated.  May use Tylenol for pain.  Avoid NSAIDs as these are hard on the kidneys.  Resume any normal medications.  Keep your follow[facility] appointment [facility] the [facility].  You should have your kidney function rechecked tomorrow.  Return to the emergency room if symptoms worsen.

## 2025-06-05 NOTE — ED TRIAGE NOTES
TRIAGE NOTE   I saw the patient as the Clinician in Triage and performed a brief history and physical exam, established acuity, and ordered appropriate tests to develop basic plan of care. Patient will be seen by an SAR[facility], resident and/or physician who will independently evaluate the patient. Please see subsequent provider notes for further details and disposition.     Brief HPI: In brief, Rambo Gerber is a 60 y.o. male that presents for few weeks now bringing up phlegm.  He thinks maybe he has a hernia because every time he coughs it feels like it hurts under his rib cage.  Otherwise he is in his normal state of health.  [facility] but they told him to come here.       Focused PE:  - Constitutional: Alert and oriented x3.  In no acute distress, well-nourished and hydrated.  Cooperative.  - Skin: Pink, warm and dry.    -Neurological: Neurologically intact.    - Musculoskeletal: LM x4, Normal gait. MSP´s intact.    - Cardiac: Regular rate rhythm.  - Pulmonary: Lungs clear bilaterally. No rales, rhonchi or wheezing.  Right base somewhat decreased. no stridor or accessory muscle use.  - Abdomen: Abdomen soft and nontender with bowel sounds.  No rebound or guarding.  No CVA tenderness.    Note, physical exam may be limited by patient positioning sitting up in a chair.    Plan/MDM: I examined the patient in triage due to high volumes in the ER.  Labs, testing , and initial imaging based upon reported CC and focused exam      - For the remainder of the patient's workup and ED course, please see the main ED provider note. We discussed need for diagnostic testing including laboratory studies and imaging. We also discussed that they may be asked to wait in the waiting room while these tests are pending. They understand that if they choose to leave without having the testing completed or resulted that we cannot rule out acute life threatening illnesses and the risks involved could lead to  worsening condition, permanent disability or even death

## 2025-06-14 LAB
ATRIAL RATE: 72 BPM
P AXIS: 66 DEGREES
P OFFSET: 193 MS
P ONSET: 146 MS
PR INTERVAL: 154 MS
Q ONSET: 223 MS
QRS COUNT: 12 BEATS
QRS DURATION: 136 MS
QT INTERVAL: 436 MS
QTC CALCULATION(BAZETT): 477 MS
QTC FREDERICIA: 463 MS
R AXIS: -73 DEGREES
T AXIS: 43 DEGREES
T OFFSET: 441 MS
VENTRICULAR RATE: 72 BPM